# Patient Record
Sex: FEMALE | Race: WHITE | NOT HISPANIC OR LATINO | Employment: FULL TIME | URBAN - METROPOLITAN AREA
[De-identification: names, ages, dates, MRNs, and addresses within clinical notes are randomized per-mention and may not be internally consistent; named-entity substitution may affect disease eponyms.]

---

## 2023-08-18 ENCOUNTER — HOSPITAL ENCOUNTER (OUTPATIENT)
Dept: DATA CONVERSION | Facility: HOSPITAL | Age: 68
Discharge: HOME | End: 2023-08-18
Payer: COMMERCIAL

## 2023-08-18 DIAGNOSIS — Z79.899 OTHER LONG TERM (CURRENT) DRUG THERAPY: ICD-10-CM

## 2023-08-28 LAB
10OH-CARBAZEPINE/CREAT UR CFM: NOT DETECTED NG/MG{CREAT}
6MAM UR QL CFM: NEGATIVE
6MAM/CREAT UR: NORMAL
7AMINOCLONAZEPAM/CREAT UR: NORMAL
8OH-AMOXAPINE UR QL: NOT DETECTED
8OH-LOXAPINE/CREAT UR CFM: NOT DETECTED NG/MG{CREAT}
A-OH ALPRAZ/CREAT UR: NORMAL
A-OH-TRIAZOLAM/CREAT UR CFM: NORMAL NG/MG{CREAT}
ALFENTANIL/CREAT UR CFM: NORMAL NG/MG{CREAT}
ALPHA-HYDROXYMIDAZOLAM: NORMAL
ALPRAZ/CREAT UR CFM: NORMAL NG/MG{CREAT}
AMINO CHLOROPYRIDINE: NOT DETECTED
AMITRIP UR QL CFM: NOT DETECTED
AMOBARBITAL UR QL CFM: NOT DETECTED
AMOXAPINE UR QL: NOT DETECTED
AMPHET/CREAT UR: NORMAL
AMPHETAMINES UR QL CFM: NEGATIVE
ANALGESICS NON-NARCOTIC UR QL: NEGATIVE
ANTICONVULSANTS UR: NORMAL
ANTIDEPRESSANTS UR QL: NORMAL
ANTIHISTAMINES UR QL CFM: NEGATIVE
ANTIPSYCHOTICS UR QL SCN: NEGATIVE
APAP UR QL: NOT DETECTED
ARIPIPRAZOLE/CREAT UR CFM: NOT DETECTED NG/MG{CREAT}
ASENAPINE: NOT DETECTED
ATENOLOL UR QL: NOT DETECTED
ATOMOXETINE/CREAT UR CFM: NOT DETECTED NG/MG{CREAT}
BACLOFEN/CREAT UR CFM: NOT DETECTED NG/MG{CREAT}
BARBITAL UR QL CFM: NOT DETECTED
BARBITURATES UR QL CFM: NEGATIVE
BENZODIAZ UR QL CFM: NEGATIVE
BENZTROPINE UR QL: NOT DETECTED
BROMPHENIRAMINE UR QL: NOT DETECTED
BUPRENORPHINE UR QL CFM: NEGATIVE
BUPRENORPHINE/CREAT UR: NORMAL
BUPROPION UR QL: NOT DETECTED
BUTABARBITAL UR QL CFM: NOT DETECTED
BUTALBITAL UR QL CFM: NOT DETECTED
BUTORPHANOL UR QL CFM: NOT DETECTED
BZE/CREAT UR: NORMAL
CAFFEINE UR QL: NOT DETECTED
CANNABINOIDS UR QL CFM: NEGATIVE
CARBAMAZEPINE UR QL: NOT DETECTED
CARBOXYTHC/CREAT UR: NORMAL
CARISOPRODOL UR QL CFM: NOT DETECTED
CHLORPHENIR UR QL CFM: NOT DETECTED
CHLORPROMAZINE UR QL CFM: NOT DETECTED
CITALOPRAM UR QL: NOT DETECTED
CLOBAZAM UR QL: NOT DETECTED
CLOMIPRAMINE UR QL: NOT DETECTED
CLONAZEPAM/CREAT UR CFM: NORMAL NG/MG{CREAT}
CLONIDINE UR QL: NOT DETECTED
CLOZAPINE UR QL: NOT DETECTED
COCAETHYLENE/CREAT UR CFM: NORMAL NG/MG{CREAT}
COCAINE UR QL CFM: NEGATIVE
COCAINE/CREAT UR CFM: NORMAL NG/MG{CREAT}
CODEINE/CREAT UR: NORMAL
CREAT UR-MCNC: NORMAL MG/DL
CYCLOBENZAPRINE UR QL CFM: NOT DETECTED
D-METHORPHAN UR QL: NOT DETECTED
D-METHORPHAN+LEVORPHANOL UR QL: NORMAL
DESALKYLFLURAZ/CREAT UR: NORMAL NG/MG{CREAT}
DESIPRAMINE UR QL CFM: NOT DETECTED
DESMETHYLCYCLOBENZAPRINE: NOT DETECTED
DESMETHYLFLUNITRAZEPAM: NORMAL
DHC/CREAT UR: NORMAL NG/MG{CREAT}
DIAZEPAM/CREAT UR: NORMAL NG/MG{CREAT}
DICLOFENAC/CREAT UR CFM: NOT DETECTED NG/MG{CREAT}
DIETHYLPROPION UR QL CFM: NOT DETECTED
DILTIAZEM UR QL: NOT DETECTED
DIPHENHY UR QL: NOT DETECTED
DOXEPIN UR QL CFM: NOT DETECTED
DOXYLAMINE UR QL: NOT DETECTED
DRUGS UR CFM: NEGATIVE
DRUGS UR: NORMAL
DULOXETINE UR QL CFM: NOT DETECTED
EDDP/CREAT UR: NORMAL
EPHEDRIN+PSEUDO UR QL: NOT DETECTED
ETHANOL UR CFM-MCNC: NORMAL MG/DL
ETHANOL UR QL CFM: NEGATIVE
EZOGABINE/CREAT UR CFM: NOT DETECTED NG/MG{CREAT}
FENTANYL UR QL CFM: NEGATIVE
FENTANYL/CREAT UR: NORMAL
FLUNITRAZEPAM UR QL CFM: NORMAL
FLUOXETINE UR QL CFM: NOT DETECTED
FLUPHENAZINE UR QL: NOT DETECTED
FLUVOXAMINE UR QL: NOT DETECTED
GABAPENTIN UR QL: PRESENT
GUAIFENESIN/CREAT UR CFM: NOT DETECTED NG/MG{CREAT}
HALLUCINOGENS UR: NEGATIVE
HALOPERIDOL UR QL CFM: NOT DETECTED
HYDROCODONE/CREAT UR: NORMAL
HYDROMORPHONE/CREAT UR: NORMAL
HYDROXYZINE UR QL: NOT DETECTED
HYPNOTICS UR QL SCN: NEGATIVE
IBUPROFEN UR QL: NOT DETECTED
ILOPERIDONE: NOT DETECTED
IMIPRAMINE UR QL CFM: NOT DETECTED
KETAMINE UR QL CFM: NOT DETECTED
KETOPROFEN/CREAT UR CFM: NOT DETECTED NG/MG{CREAT}
LAMOTRIGINE/CREAT UR CFM: NOT DETECTED NG/MG{CREAT}
LEVEL OF DETECTION: (TOX25): NORMAL
LEVETIRACETAM/CREAT UR CFM: NOT DETECTED NG/MG{CREAT}
LIDOCAIN UR QL: NOT DETECTED
LIDOCAIN UR QL: NOT DETECTED
LOCAL ANESTHETICS: NEGATIVE
LORAZEPAM/CREAT UR: NORMAL
LOXAPINE UR QL: NOT DETECTED
LURASIDONE UR CFM-MCNC: NOT DETECTED NG/ML
M-CPP UR QL: NOT DETECTED
MAPROTILINE UR QL: NOT DETECTED
MDA/CREAT UR: NORMAL NG/MG{CREAT}
MDEA UR QL: NOT DETECTED
MDMA/CREAT UR: NORMAL NG/MG{CREAT}
ME-PHENIDATE UR QL CFM: NOT DETECTED
MEPERIDINE UR QL CFM: NOT DETECTED
MEPHOBARBITAL UR QL CFM: NOT DETECTED
MEPIVACAINE UR QL: NOT DETECTED
MEPROBAMATE UR QL CFM: NOT DETECTED
MESORIDAZINE UR QL CFM: NOT DETECTED
METAXALONE/CREAT UR CFM: NOT DETECTED NG/MG{CREAT}
METHADONE UR QL CFM: NEGATIVE
METHADONE/CREAT UR: NORMAL
METHAMPHET/CREAT UR: NORMAL
METHCATHINONE/CREAT UR CFM: NOT DETECTED NG/MG{CREAT}
METHOCARBAMOL UR QL: NOT DETECTED
METOPROLOL UR QL: NOT DETECTED
MIDAZOLAM/CREAT UR CFM: NORMAL NG/MG{CREAT}
MILNACIPRAN/CREAT UR CFM: NOT DETECTED NG/MG{CREAT}
MIRTAZAPINE UR QL CFM: NOT DETECTED
MOLINDONE/CREAT UR CFM: NOT DETECTED NG/MG{CREAT}
MORPHINE/CREAT UR: NORMAL
MUSCLE RELAXANTS: NEGATIVE
N-NORTRAMADOL/CREAT UR CFM: NORMAL NG/MG{CREAT}
NALBUPHINE UR QL CFM: NOT DETECTED
NALTREXONE UR QL CFM: NOT DETECTED
NAPROXEN/CREAT UR CFM: NOT DETECTED NG/MG{CREAT}
NARCOTICS UR: NORMAL
NEFAZODONE UR QL: NOT DETECTED
NORBUPRENORPHINE/CREAT UR: NORMAL
NORCITALOPRAM UR QL: NOT DETECTED
NORCLOMIPRAMINE UR QL: NOT DETECTED
NORCLOZAPINE UR QL: NOT DETECTED
NORCODEINE/CREAT UR CFM: NORMAL NG/MG{CREAT}
NORDIAZEPAM/CREAT UR: NORMAL
NORDOXEPIN UR QL: NOT DETECTED
NORFENTANYL/CREAT UR: NORMAL
NORFLUOXETINE UR QL CFM: NOT DETECTED
NORHYDROCODONE/CREAT UR: NORMAL
NORKETAMINE UR CFM-MCNC: NOT DETECTED NG/ML
NORMEPERIDINE UR QL CFM: NOT DETECTED
NORMORPHINE UR-MCNC: NORMAL NG/ML
NOROXYCODONE/CREAT UR: NORMAL
NOROXYMORPHONE/CREAT UR CFM: NORMAL NG/MG{CREAT}
NORPROPOXYPH UR QL CFM: NOT DETECTED
NORSERTRALINE UR QL: PRESENT
NORTRIP UR QL CFM: NOT DETECTED
O-NORTRAMADOL UR CFM-MCNC: NORMAL NG/ML
ODV UR QL: NOT DETECTED
OH-BUPROPION UR QL CFM: NOT DETECTED
OLANZAPINE UR QL: NOT DETECTED
OPIATES UR QL CFM: NEGATIVE
ORPHENADRINE UR QL: NOT DETECTED
OXAPROZIN/CREAT UR CFM: NOT DETECTED NG/MG{CREAT}
OXAZEPAM/CREAT UR: NORMAL
OXYCODONE UR QL CFM: NEGATIVE
OXYCODONE/CREAT UR: NORMAL
OXYMORPHONE/CREAT UR: NORMAL
PAROXETINE UR QL: NOT DETECTED
PCP UR QL CFM: NOT DETECTED
PENTAZOCINE UR QL CFM: NOT DETECTED
PENTOBARB UR QL CFM: NOT DETECTED
PERPHENAZINE UR QL: NOT DETECTED
PHENMETRAZINE UR QL CFM: NOT DETECTED
PHENOBARB UR QL CFM: NOT DETECTED
PHENTERMINE UR QL CFM: NOT DETECTED
PHENYTOIN UR QL: NOT DETECTED
PIMOZIDE/CREAT UR CFM: NOT DETECTED NG/MG{CREAT}
PPA UR QL: NOT DETECTED
PPAA UR QL: NOT DETECTED
PREGABALIN UR QL CFM: NOT DETECTED
PRIMIDONE/CREAT UR CFM: NOT DETECTED NG/MG{CREAT}
PROCAINE UR QL: NOT DETECTED
PROCHLORPERAZINE UR QL: NOT DETECTED
PROMETHAZINE UR QL: NOT DETECTED
PROPOXYPH UR QL CFM: NOT DETECTED
PROPRANOLOL UR QL: NOT DETECTED
PROTRIP UR QL: NOT DETECTED
PYRILAMINE UR QL: NOT DETECTED
QUETIAPINE UR QL: NOT DETECTED
RISPERIDONE UR QL: NOT DETECTED
RUFINAMIDE/CREAT UR CFM: NOT DETECTED NG/MG{CREAT}
SALICYLATES UR QL: NOT DETECTED
SECOBARBITAL UR QL CFM: NOT DETECTED
SERTRALINE UR QL: PRESENT
SUFENTANIL/CREAT UR CFM: NORMAL NG/MG{CREAT}
SYMPATHOMIMETICS UR QL CFM: NEGATIVE
TAPENTADOL UR QL CFM: NEGATIVE
TAPENTADOL/CREAT UR: NORMAL
TEMAZEPAM/CREAT UR: NORMAL
THEOPHYLLINE/CREAT UR CFM: NOT DETECTED NG/MG{CREAT}
THIOPENTAL UR QL CFM: NOT DETECTED
THIORIDAZINE UR QL CFM: NOT DETECTED
THIOTHIXENE UR QL: NOT DETECTED
TIAGABINE/CREAT UR CFM: NOT DETECTED NG/MG{CREAT}
TIZANIDINE/CREAT UR CFM: NOT DETECTED NG/MG{CREAT}
TOPIRAMATE/CREAT UR CFM: PRESENT NG/MG{CREAT}
TRAMADOL UR QL CFM: NORMAL
TRAZODONE UR QL: NOT DETECTED
TRIFPERAZINE UR QL: NOT DETECTED
TRIMIPRAMINE UR QL: NOT DETECTED
TRIPROLIDINE: NOT DETECTED
VENLAFAXINE UR QL: NOT DETECTED
VERAPAMIL UR QL: NOT DETECTED
VILAZ UR QL: NOT DETECTED
ZALEPLON: NORMAL
ZIPRASIDONE/CREAT UR CFM: NOT DETECTED NG/MG{CREAT}
ZOLPIDEM UR QL CFM: NOT DETECTED
ZOLPIDEM/CREAT UR: NOT DETECTED
ZONISAMIDE/CREAT UR CFM: NOT DETECTED NG/MG{CREAT}
ZOPICLONE UR QL: NOT DETECTED

## 2023-09-15 VITALS
BODY MASS INDEX: 32.88 KG/M2 | HEIGHT: 61 IN | RESPIRATION RATE: 16 BRPM | OXYGEN SATURATION: 97 % | SYSTOLIC BLOOD PRESSURE: 157 MMHG | DIASTOLIC BLOOD PRESSURE: 75 MMHG | HEART RATE: 77 BPM

## 2023-09-20 PROBLEM — R19.5 POSITIVE COLORECTAL CANCER SCREENING USING COLOGUARD TEST: Status: ACTIVE | Noted: 2023-09-20

## 2023-09-20 PROBLEM — R06.02 SHORTNESS OF BREATH: Status: ACTIVE | Noted: 2023-09-20

## 2023-09-20 PROBLEM — E78.5 HYPERLIPIDEMIA: Status: ACTIVE | Noted: 2023-09-20

## 2023-09-20 PROBLEM — R53.83 FATIGUE: Status: ACTIVE | Noted: 2023-09-20

## 2023-09-20 PROBLEM — M54.14 THORACIC RADICULOPATHY: Status: ACTIVE | Noted: 2023-09-20

## 2023-09-20 PROBLEM — I10 HYPERTENSION: Status: ACTIVE | Noted: 2023-09-20

## 2023-09-20 PROBLEM — S42.212S FX HUMERAL NECK, LEFT, SEQUELA: Status: ACTIVE | Noted: 2023-09-20

## 2023-09-20 PROBLEM — M79.18 MYOFASCIAL PAIN: Status: ACTIVE | Noted: 2023-09-20

## 2023-09-20 PROBLEM — R07.89 ATYPICAL CHEST PAIN: Status: ACTIVE | Noted: 2023-09-20

## 2023-09-20 PROBLEM — M75.21 BICEPS TENDONITIS, RIGHT: Status: ACTIVE | Noted: 2023-09-20

## 2023-09-20 PROBLEM — R00.2 PALPITATIONS: Status: ACTIVE | Noted: 2023-09-20

## 2023-09-20 PROBLEM — M51.369 DEGENERATION OF LUMBAR INTERVERTEBRAL DISC: Status: ACTIVE | Noted: 2023-09-20

## 2023-09-20 PROBLEM — M51.36 DEGENERATION OF LUMBAR INTERVERTEBRAL DISC: Status: ACTIVE | Noted: 2023-09-20

## 2023-09-20 PROBLEM — M79.2 NEUROPATHIC PAIN: Status: ACTIVE | Noted: 2023-09-20

## 2023-09-20 PROBLEM — M25.519 SHOULDER PAIN: Status: ACTIVE | Noted: 2023-09-20

## 2023-09-20 PROBLEM — R94.31 ABNORMAL ECG: Status: ACTIVE | Noted: 2023-09-20

## 2023-09-20 PROBLEM — R42 DIZZINESS: Status: ACTIVE | Noted: 2023-09-20

## 2023-09-20 PROBLEM — R26.89 BALANCE PROBLEM: Status: ACTIVE | Noted: 2023-09-20

## 2023-09-20 PROBLEM — E66.9 MILD OBESITY: Status: ACTIVE | Noted: 2023-09-20

## 2023-09-20 PROBLEM — R41.3 MEMORY DEFICIT: Status: ACTIVE | Noted: 2023-09-20

## 2023-09-20 PROBLEM — I47.10 PSVT (PAROXYSMAL SUPRAVENTRICULAR TACHYCARDIA) (CMS-HCC): Status: ACTIVE | Noted: 2023-09-20

## 2023-09-20 PROBLEM — M19.019 ACROMIOCLAVICULAR JOINT ARTHRITIS: Status: ACTIVE | Noted: 2023-09-20

## 2023-09-20 PROBLEM — M54.16 LUMBAR RADICULOPATHY: Status: ACTIVE | Noted: 2023-09-20

## 2023-09-20 PROBLEM — M94.0 COSTOCHONDRITIS: Status: ACTIVE | Noted: 2023-09-20

## 2023-09-20 PROBLEM — J44.9 CHRONIC OBSTRUCTIVE PULMONARY DISEASE (MULTI): Status: ACTIVE | Noted: 2023-09-20

## 2023-09-20 PROBLEM — I49.1 PAC (PREMATURE ATRIAL CONTRACTION): Status: ACTIVE | Noted: 2023-09-20

## 2023-09-20 PROBLEM — M77.00 MEDIAL EPICONDYLITIS OF ELBOW: Status: ACTIVE | Noted: 2023-09-20

## 2023-09-20 RX ORDER — ACETAMINOPHEN 160 MG/5ML
1 SUSPENSION, ORAL (FINAL DOSE FORM) ORAL
COMMUNITY
Start: 2023-07-13 | End: 2023-10-30 | Stop reason: WASHOUT

## 2023-09-20 RX ORDER — BUSPIRONE HYDROCHLORIDE 5 MG/1
1 TABLET ORAL 3 TIMES DAILY PRN
COMMUNITY
Start: 2023-07-13 | End: 2024-04-19 | Stop reason: DRUGHIGH

## 2023-09-20 RX ORDER — DEXAMETHASONE 6 MG/1
1 TABLET ORAL EVERY 24 HOURS
COMMUNITY
Start: 2020-12-24 | End: 2023-10-30 | Stop reason: ALTCHOICE

## 2023-09-20 RX ORDER — TRAMADOL HYDROCHLORIDE 50 MG/1
50 TABLET ORAL EVERY 6 HOURS PRN
COMMUNITY
Start: 2023-04-21 | End: 2023-11-03 | Stop reason: SDUPTHER

## 2023-09-20 RX ORDER — SODIUM, POTASSIUM,MAG SULFATES 17.5-3.13G
SOLUTION, RECONSTITUTED, ORAL ORAL
COMMUNITY
Start: 2020-12-04 | End: 2023-10-30 | Stop reason: ALTCHOICE

## 2023-09-20 RX ORDER — GABAPENTIN 300 MG/1
1 CAPSULE ORAL 3 TIMES DAILY
COMMUNITY
End: 2023-11-03 | Stop reason: SDUPTHER

## 2023-09-20 RX ORDER — ENOXAPARIN SODIUM 100 MG/ML
40 INJECTION SUBCUTANEOUS DAILY
COMMUNITY
Start: 2020-12-22 | End: 2023-10-30 | Stop reason: ALTCHOICE

## 2023-09-20 RX ORDER — TRIAMCINOLONE ACETONIDE 1 MG/ML
1 LOTION TOPICAL 2 TIMES DAILY
COMMUNITY
Start: 2021-07-12 | End: 2023-10-30 | Stop reason: ALTCHOICE

## 2023-09-20 RX ORDER — TOPIRAMATE 50 MG/1
50 TABLET, FILM COATED ORAL NIGHTLY
COMMUNITY
End: 2024-02-09 | Stop reason: WASHOUT

## 2023-09-20 RX ORDER — AMOXICILLIN AND CLAVULANATE POTASSIUM 875; 125 MG/1; MG/1
1 TABLET, FILM COATED ORAL 2 TIMES DAILY
COMMUNITY
Start: 2020-12-24 | End: 2023-10-30 | Stop reason: WASHOUT

## 2023-09-20 RX ORDER — METOPROLOL TARTRATE 100 MG/1
1 TABLET ORAL 2 TIMES DAILY
COMMUNITY
Start: 2016-11-07 | End: 2023-10-30 | Stop reason: ALTCHOICE

## 2023-09-20 RX ORDER — FREMANEZUMAB-VFRM 225 MG/1.5ML
225 INJECTION SUBCUTANEOUS
COMMUNITY
Start: 2023-08-24

## 2023-09-20 RX ORDER — DILTIAZEM HYDROCHLORIDE 240 MG/1
1 CAPSULE, EXTENDED RELEASE ORAL DAILY
COMMUNITY
Start: 2016-08-08 | End: 2023-10-30 | Stop reason: ALTCHOICE

## 2023-10-14 DIAGNOSIS — J44.9 CHRONIC OBSTRUCTIVE PULMONARY DISEASE, UNSPECIFIED COPD TYPE (MULTI): Primary | ICD-10-CM

## 2023-10-14 RX ORDER — FLUTICASONE PROPIONATE AND SALMETEROL 50; 250 UG/1; UG/1
1 POWDER RESPIRATORY (INHALATION) 2 TIMES DAILY
Qty: 60 EACH | Refills: 11 | Status: SHIPPED | OUTPATIENT
Start: 2023-10-14 | End: 2023-10-30 | Stop reason: DRUGHIGH

## 2023-10-18 PROBLEM — G43.009 MIGRAINE WITHOUT AURA AND WITHOUT STATUS MIGRAINOSUS, NOT INTRACTABLE: Status: ACTIVE | Noted: 2023-10-18

## 2023-10-18 RX ORDER — UBIDECARENONE 30 MG
30 CAPSULE ORAL DAILY
COMMUNITY

## 2023-10-18 RX ORDER — ASPIRIN 81 MG/1
81 TABLET ORAL DAILY
COMMUNITY
Start: 2023-10-04

## 2023-10-18 RX ORDER — ERENUMAB-AOOE 70 MG/ML
70 INJECTION SUBCUTANEOUS
COMMUNITY
End: 2023-10-30 | Stop reason: ALTCHOICE

## 2023-10-18 RX ORDER — RIZATRIPTAN BENZOATE 10 MG/1
10 TABLET ORAL ONCE AS NEEDED
COMMUNITY
Start: 2023-09-17 | End: 2023-10-30 | Stop reason: ALTCHOICE

## 2023-10-18 RX ORDER — ATORVASTATIN CALCIUM 40 MG/1
40 TABLET, FILM COATED ORAL DAILY
COMMUNITY
Start: 2023-10-04

## 2023-10-18 RX ORDER — SERTRALINE HYDROCHLORIDE 100 MG/1
100 TABLET, FILM COATED ORAL DAILY
COMMUNITY
Start: 2023-09-17 | End: 2024-04-19 | Stop reason: SDUPTHER

## 2023-10-18 RX ORDER — ERGOCALCIFEROL 1.25 MG/1
1 CAPSULE ORAL
COMMUNITY
Start: 2023-09-20 | End: 2024-04-19 | Stop reason: SDUPTHER

## 2023-10-18 RX ORDER — SUMATRIPTAN SUCCINATE 100 MG/1
100 TABLET ORAL
COMMUNITY
Start: 2023-10-12

## 2023-10-18 RX ORDER — LANOLIN ALCOHOL/MO/W.PET/CERES
1000 CREAM (GRAM) TOPICAL DAILY
COMMUNITY
Start: 2023-09-16 | End: 2024-02-09 | Stop reason: WASHOUT

## 2023-10-24 ASSESSMENT — ENCOUNTER SYMPTOMS
RHINORRHEA: 0
VOMITING: 0
CLAUDICATION: 0
HEMOPTYSIS: 0
SPUTUM PRODUCTION: 1
FEVER: 0
SHORTNESS OF BREATH: 1
LEG PAIN: 0
ABDOMINAL PAIN: 0
SORE THROAT: 0
ORTHOPNEA: 0
SWOLLEN GLANDS: 0
WHEEZING: 1
SYNCOPE: 0
HEADACHES: 1
PND: 0
NECK PAIN: 1

## 2023-10-25 NOTE — PROGRESS NOTES
HPI:       Hypertension:          The patient has questions regarding neurology referral for vestibular migraines. Now getting Ajovy injections. Also on topomax, gabapentin, imitrex.  Pt has been off her valsartin since Sept, pressures have been elevated at other offices.         The patients cardiovascular risk factors include:         Cardiac Risk Factors  Age > 45-male, > 55-female:  YES  +1   Smoking:   NO   Sig. family hx of CHD*:  NO   Hypertension:   YES  +1   Diabetes:   NO   HDL < 35:   NO   HDL > 59:   YES  -1   Total: 1     *- Sig. family h/o CHD per NCEP = MI or sudden death at <54yo in   father or other 1st-degree male relative, or <64yo in mother or   other 1st-degree female relative           The patients adherence to the treatment regimen is Excellent         Responses to the medications has been Excellent    Hyperlipidemia:   The patient does not use medications that may worsen dyslipidemias (corticosteroids, progestins, anabolic steroids, diuretics, beta-blockers, amiodarone, cyclosporine, olanzapine). The patient exercises occasionally.  The patient is not known to have coexisting coronary artery disease.    MEDICATIONS:   Current Outpatient Medications   Medication Sig Dispense Refill    aspirin 81 mg EC tablet Take 1 tablet (81 mg) by mouth once daily.      atorvastatin (Lipitor) 40 mg tablet Take 1 tablet (40 mg) by mouth once daily.      busPIRone (Buspar) 5 mg tablet Take 1 tablet (5 mg) by mouth 3 times a day as needed.      co-enzyme Q-10 (CoQ-10) 30 mg capsule Take 1 capsule (30 mg) by mouth once daily.      cyanocobalamin (Vitamin B-12) 1,000 mcg tablet Take 1 tablet (1,000 mcg) by mouth once daily.      ergocalciferol (Vitamin D-2) 1.25 MG (13066 UT) capsule Take 1 capsule (1,250 mcg) by mouth 1 (one) time per week.      fremanezumab (Ajovy Autoinjector) 225 mg/1.5 mL auto-injector Inject 1 Pen (225 mg) under the skin every 30 (thirty) days.      gabapentin (Neurontin) 300 mg capsule  Take 1 capsule (300 mg) by mouth 3 times a day.      sertraline (Zoloft) 100 mg tablet Take 1 tablet (100 mg) by mouth once daily.      SUMAtriptan (Imitrex) 100 mg tablet Take 1 tablet (100 mg) by mouth.      topiramate (Topamax) 50 mg tablet Take 1 tablet (50 mg) by mouth once daily at bedtime.      traMADol (Ultram) 50 mg tablet Take 1 tablet (50 mg) by mouth every 6 hours if needed.      fluticasone propion-salmeteroL (Advair Diskus) 500-50 mcg/dose diskus inhaler Inhale 1 puff 2 times a day. Rinse mouth with water after use to reduce aftertaste and incidence of candidiasis. Do not swallow. 180 each 3    valsartan (Diovan) 80 mg tablet Take 1 tablet (80 mg) by mouth once daily. 90 tablet 1     No current facility-administered medications for this visit.     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    Dyslipidemia     10 yr risk 3.6% 2018    History of COVID-19 12/2020    Hypertension     Migraines     [facility]    SVT (supraventricular tachycardia)      FAMILY HISTORY:   Family History   Problem Relation Name Age of Onset    Stroke Mother      Hypertension Mother      Blood clot Father      Other (black lung) Father      Atrial fibrillation Sister      Breast cancer Sister      Breast cancer Sister      Alzheimer's disease Sister      Other (pacemaker) Brother      Other (htn induced brain bleed) Son      Dementia Other sister     Heart attack Other sister     Diabetes Other sister      SOCIAL HISTORY:   Social History     Tobacco Use    Smoking status: Former     Types: Cigarettes    Smokeless tobacco: Never   Vaping Use    Vaping Use: Never used   Substance Use Topics    Alcohol use: Never    Drug use:  Never         ROS:      CONSTITUTION:  alert and oriented     OPHTHALMOLOGY:          no Change in vision.         CARDIOLOGY:          no Chest pain.  no Dyspnea on exertion.  no Shortness of breath.         ENDOCRINOLOGY:          no Excessive thirst.  no Excessive urination.  no Fatigue.  no Skin changes.  no Weight gain.  no Weight loss.         SKIN:          no Healing problems.         NEUROLOGY:          no Loss of sensation in specific body area.  no Burning pain in feet.  no Peripheral neuropathy.  no Tingling/numbness.    LABS: due     VITAL SIGNS:  /88   Pulse 76   Wt 90 kg (198 lb 6.4 oz)   BMI 37.49 kg/m²     General Appearance: awake, alert, oriented, in no acute distress  Lungs: Normal expansion.  Clear to auscultation.  No rales, rhonchi, or wheezing.  Heart: Heart sounds are normal.  Regular rate and rhythm without murmur, gallop or rub.  Extremities: Extremities warm to touch, pink, with no edema.  Neurologic: Alert and oriented x 3, gait normal., reflexes normal and symmetric, strength and  sensation grossly normal    Kristy was seen today for hypertension.  Diagnoses and all orders for this visit:  Primary hypertension (Primary)  -     Basic Metabolic Panel; Future  -     Albumin , Urine Random; Future  -     Urinalysis with Reflex Microscopic; Future  -     valsartan (Diovan) 80 mg tablet; Take 1 tablet (80 mg) by mouth once daily.  -     Follow Up In Primary Care - Established; Future  Mixed hyperlipidemia  Comments:  cont atorvastatin  Orders:  -     Lipid Panel; Future  Chronic obstructive pulmonary disease, unspecified COPD type (CMS/HCC)  -     fluticasone propion-salmeteroL (Advair Diskus) 500-50 mcg/dose diskus inhaler; Inhale 1 puff 2 times a day. Rinse mouth with water after use to reduce aftertaste and incidence of candidiasis. Do not swallow.

## 2023-10-30 ENCOUNTER — OFFICE VISIT (OUTPATIENT)
Dept: PRIMARY CARE | Facility: CLINIC | Age: 68
End: 2023-10-30
Payer: COMMERCIAL

## 2023-10-30 ENCOUNTER — LAB (OUTPATIENT)
Dept: LAB | Facility: LAB | Age: 68
End: 2023-10-30
Payer: COMMERCIAL

## 2023-10-30 VITALS
HEART RATE: 76 BPM | SYSTOLIC BLOOD PRESSURE: 132 MMHG | WEIGHT: 198.4 LBS | BODY MASS INDEX: 37.49 KG/M2 | DIASTOLIC BLOOD PRESSURE: 88 MMHG

## 2023-10-30 DIAGNOSIS — E78.2 MIXED HYPERLIPIDEMIA: ICD-10-CM

## 2023-10-30 DIAGNOSIS — J44.9 CHRONIC OBSTRUCTIVE PULMONARY DISEASE, UNSPECIFIED COPD TYPE (MULTI): ICD-10-CM

## 2023-10-30 DIAGNOSIS — I10 PRIMARY HYPERTENSION: Primary | ICD-10-CM

## 2023-10-30 DIAGNOSIS — I10 PRIMARY HYPERTENSION: ICD-10-CM

## 2023-10-30 LAB
ANION GAP SERPL CALC-SCNC: 16 MMOL/L (ref 10–20)
APPEARANCE UR: CLEAR
BILIRUB UR STRIP.AUTO-MCNC: NEGATIVE MG/DL
BUN SERPL-MCNC: 8 MG/DL (ref 6–23)
CALCIUM SERPL-MCNC: 9.9 MG/DL (ref 8.6–10.3)
CHLORIDE SERPL-SCNC: 103 MMOL/L (ref 98–107)
CHOLEST SERPL-MCNC: 178 MG/DL (ref 0–199)
CHOLESTEROL/HDL RATIO: 2
CO2 SERPL-SCNC: 27 MMOL/L (ref 21–32)
COLOR UR: YELLOW
CREAT SERPL-MCNC: 0.74 MG/DL (ref 0.5–1.05)
GFR SERPL CREATININE-BSD FRML MDRD: 89 ML/MIN/1.73M*2
GLUCOSE SERPL-MCNC: 115 MG/DL (ref 74–99)
GLUCOSE UR STRIP.AUTO-MCNC: NEGATIVE MG/DL
HDLC SERPL-MCNC: 87.4 MG/DL
KETONES UR STRIP.AUTO-MCNC: NEGATIVE MG/DL
LDLC SERPL CALC-MCNC: 70 MG/DL
LEUKOCYTE ESTERASE UR QL STRIP.AUTO: NEGATIVE
NITRITE UR QL STRIP.AUTO: NEGATIVE
NON HDL CHOLESTEROL: 91 MG/DL (ref 0–149)
PH UR STRIP.AUTO: 6 [PH]
POTASSIUM SERPL-SCNC: 4.5 MMOL/L (ref 3.5–5.3)
PROT UR STRIP.AUTO-MCNC: NEGATIVE MG/DL
RBC # UR STRIP.AUTO: NEGATIVE /UL
SODIUM SERPL-SCNC: 141 MMOL/L (ref 136–145)
SP GR UR STRIP.AUTO: 1.01
TRIGL SERPL-MCNC: 102 MG/DL (ref 0–149)
UROBILINOGEN UR STRIP.AUTO-MCNC: <2 MG/DL
VLDL: 20 MG/DL (ref 0–40)

## 2023-10-30 PROCEDURE — 99214 OFFICE O/P EST MOD 30 MIN: CPT | Performed by: FAMILY MEDICINE

## 2023-10-30 PROCEDURE — 1159F MED LIST DOCD IN RCRD: CPT | Performed by: FAMILY MEDICINE

## 2023-10-30 PROCEDURE — 3075F SYST BP GE 130 - 139MM HG: CPT | Performed by: FAMILY MEDICINE

## 2023-10-30 PROCEDURE — 3079F DIAST BP 80-89 MM HG: CPT | Performed by: FAMILY MEDICINE

## 2023-10-30 PROCEDURE — 82570 ASSAY OF URINE CREATININE: CPT

## 2023-10-30 PROCEDURE — 1160F RVW MEDS BY RX/DR IN RCRD: CPT | Performed by: FAMILY MEDICINE

## 2023-10-30 PROCEDURE — 36415 COLL VENOUS BLD VENIPUNCTURE: CPT

## 2023-10-30 PROCEDURE — 1036F TOBACCO NON-USER: CPT | Performed by: FAMILY MEDICINE

## 2023-10-30 PROCEDURE — 1125F AMNT PAIN NOTED PAIN PRSNT: CPT | Performed by: FAMILY MEDICINE

## 2023-10-30 PROCEDURE — 82043 UR ALBUMIN QUANTITATIVE: CPT

## 2023-10-30 PROCEDURE — 1170F FXNL STATUS ASSESSED: CPT | Performed by: FAMILY MEDICINE

## 2023-10-30 PROCEDURE — 3074F SYST BP LT 130 MM HG: CPT | Performed by: FAMILY MEDICINE

## 2023-10-30 PROCEDURE — 3078F DIAST BP <80 MM HG: CPT | Performed by: FAMILY MEDICINE

## 2023-10-30 RX ORDER — FLUTICASONE PROPIONATE AND SALMETEROL 500; 50 UG/1; UG/1
1 POWDER RESPIRATORY (INHALATION) 2 TIMES DAILY
Qty: 180 EACH | Refills: 3 | Status: SHIPPED | OUTPATIENT
Start: 2023-10-30 | End: 2024-02-09 | Stop reason: WASHOUT

## 2023-10-30 RX ORDER — VALSARTAN 80 MG/1
80 TABLET ORAL DAILY
Qty: 90 TABLET | Refills: 1 | Status: SHIPPED | OUTPATIENT
Start: 2023-10-30 | End: 2024-04-19 | Stop reason: SDUPTHER

## 2023-10-30 ASSESSMENT — ACTIVITIES OF DAILY LIVING (ADL)
HEARING - RIGHT EAR: FUNCTIONAL
FEEDING YOURSELF: INDEPENDENT
JUDGMENT_ADEQUATE_SAFELY_COMPLETE_DAILY_ACTIVITIES: YES
PATIENT'S MEMORY ADEQUATE TO SAFELY COMPLETE DAILY ACTIVITIES?: YES
BATHING: INDEPENDENT
GROOMING: INDEPENDENT
ASSISTIVE_DEVICE: EYEGLASSES
ADEQUATE_TO_COMPLETE_ADL: YES
HEARING - LEFT EAR: FUNCTIONAL
DRESSING YOURSELF: INDEPENDENT
WALKS IN HOME: INDEPENDENT
TOILETING: INDEPENDENT

## 2023-10-30 ASSESSMENT — PATIENT HEALTH QUESTIONNAIRE - PHQ9
5. POOR APPETITE OR OVEREATING: NOT AT ALL
2. FEELING DOWN, DEPRESSED OR HOPELESS: MORE THAN HALF THE DAYS
1. LITTLE INTEREST OR PLEASURE IN DOING THINGS: MORE THAN HALF THE DAYS
8. MOVING OR SPEAKING SO SLOWLY THAT OTHER PEOPLE COULD HAVE NOTICED. OR THE OPPOSITE, BEING SO FIGETY OR RESTLESS THAT YOU HAVE BEEN MOVING AROUND A LOT MORE THAN USUAL: MORE THAN HALF THE DAYS
9. THOUGHTS THAT YOU WOULD BE BETTER OFF DEAD, OR OF HURTING YOURSELF: NOT AT ALL
SUM OF ALL RESPONSES TO PHQ9 QUESTIONS 1 AND 2: 4
3. TROUBLE FALLING OR STAYING ASLEEP OR SLEEPING TOO MUCH: MORE THAN HALF THE DAYS
SUM OF ALL RESPONSES TO PHQ QUESTIONS 1-9: 12
6. FEELING BAD ABOUT YOURSELF - OR THAT YOU ARE A FAILURE OR HAVE LET YOURSELF OR YOUR FAMILY DOWN: NOT AT ALL
4. FEELING TIRED OR HAVING LITTLE ENERGY: MORE THAN HALF THE DAYS
7. TROUBLE CONCENTRATING ON THINGS, SUCH AS READING THE NEWSPAPER OR WATCHING TELEVISION: MORE THAN HALF THE DAYS
10. IF YOU CHECKED OFF ANY PROBLEMS, HOW DIFFICULT HAVE THESE PROBLEMS MADE IT FOR YOU TO DO YOUR WORK, TAKE CARE OF THINGS AT HOME, OR GET ALONG WITH OTHER PEOPLE: SOMEWHAT DIFFICULT

## 2023-10-30 ASSESSMENT — PAIN SCALES - GENERAL: PAINLEVEL: 7

## 2023-10-31 LAB
CREAT UR-MCNC: 48.1 MG/DL (ref 20–320)
MICROALBUMIN UR-MCNC: <7 MG/L
MICROALBUMIN/CREAT UR: NORMAL MG/G{CREAT}

## 2023-11-03 ENCOUNTER — OFFICE VISIT (OUTPATIENT)
Dept: PAIN MEDICINE | Facility: CLINIC | Age: 68
End: 2023-11-03
Payer: COMMERCIAL

## 2023-11-03 VITALS
WEIGHT: 198 LBS | DIASTOLIC BLOOD PRESSURE: 68 MMHG | SYSTOLIC BLOOD PRESSURE: 120 MMHG | HEIGHT: 61 IN | BODY MASS INDEX: 37.38 KG/M2

## 2023-11-03 DIAGNOSIS — M75.21 BILATERAL BICIPITAL TENOSYNOVITIS: ICD-10-CM

## 2023-11-03 DIAGNOSIS — M54.16 LUMBAR RADICULOPATHY: ICD-10-CM

## 2023-11-03 DIAGNOSIS — M77.01 MEDIAL EPICONDYLITIS OF RIGHT ELBOW: ICD-10-CM

## 2023-11-03 DIAGNOSIS — M75.22 BILATERAL BICIPITAL TENOSYNOVITIS: ICD-10-CM

## 2023-11-03 DIAGNOSIS — M77.02 MEDIAL EPICONDYLITIS OF LEFT ELBOW: Primary | ICD-10-CM

## 2023-11-03 DIAGNOSIS — M79.18 MYOFASCIAL PAIN: ICD-10-CM

## 2023-11-03 PROBLEM — R10.32 LEFT INGUINAL PAIN: Status: ACTIVE | Noted: 2022-12-02

## 2023-11-03 PROBLEM — M75.20 BICEPS TENDINITIS: Status: ACTIVE | Noted: 2022-12-02

## 2023-11-03 PROBLEM — M75.111 NONTRAUMATIC INCOMPLETE TEAR OF RIGHT ROTATOR CUFF: Status: ACTIVE | Noted: 2023-01-13

## 2023-11-03 PROBLEM — M75.20 BICIPITAL TENOSYNOVITIS: Status: ACTIVE | Noted: 2022-12-02

## 2023-11-03 PROBLEM — R07.9 CHEST PAIN: Status: ACTIVE | Noted: 2022-12-02

## 2023-11-03 PROBLEM — I10 ESSENTIAL (PRIMARY) HYPERTENSION: Status: ACTIVE | Noted: 2022-12-02

## 2023-11-03 PROBLEM — U07.1 COVID-19: Status: ACTIVE | Noted: 2022-12-02

## 2023-11-03 PROBLEM — S42.213A FRACTURE OF NECK OF HUMERUS: Status: ACTIVE | Noted: 2022-12-02

## 2023-11-03 PROBLEM — G89.29 CHRONIC PAIN: Status: ACTIVE | Noted: 2022-12-02

## 2023-11-03 PROBLEM — I47.10 PAROXYSMAL SUPRAVENTRICULAR TACHYCARDIA (CMS-HCC): Status: ACTIVE | Noted: 2022-12-02

## 2023-11-03 PROBLEM — R94.31 ABNORMAL ELECTROCARDIOGRAPHY: Status: ACTIVE | Noted: 2022-11-25

## 2023-11-03 PROBLEM — R26.89 BALANCE PROBLEMS: Status: ACTIVE | Noted: 2022-12-02

## 2023-11-03 PROBLEM — R06.02 SHORTNESS OF BREATH: Status: ACTIVE | Noted: 2022-02-18

## 2023-11-03 PROBLEM — M51.36 DEGENERATION OF INTERVERTEBRAL DISC OF LUMBAR REGION: Status: ACTIVE | Noted: 2022-12-02

## 2023-11-03 PROBLEM — M51.369 DEGENERATION OF INTERVERTEBRAL DISC OF LUMBAR REGION: Status: ACTIVE | Noted: 2022-12-02

## 2023-11-03 PROBLEM — M75.100 NONTRAUMATIC TEAR OF ROTATOR CUFF: Status: ACTIVE | Noted: 2022-12-02

## 2023-11-03 PROBLEM — E66.9 OBESITY: Status: ACTIVE | Noted: 2022-12-02

## 2023-11-03 PROCEDURE — 20550 NJX 1 TENDON SHEATH/LIGAMENT: CPT | Performed by: PHYSICIAN ASSISTANT

## 2023-11-03 PROCEDURE — 1159F MED LIST DOCD IN RCRD: CPT | Performed by: PHYSICIAN ASSISTANT

## 2023-11-03 PROCEDURE — 1160F RVW MEDS BY RX/DR IN RCRD: CPT | Performed by: PHYSICIAN ASSISTANT

## 2023-11-03 PROCEDURE — 99214 OFFICE O/P EST MOD 30 MIN: CPT | Mod: 50,25 | Performed by: PHYSICIAN ASSISTANT

## 2023-11-03 PROCEDURE — 2500000004 HC RX 250 GENERAL PHARMACY W/ HCPCS (ALT 636 FOR OP/ED): Performed by: PHYSICIAN ASSISTANT

## 2023-11-03 PROCEDURE — 20552 NJX 1/MLT TRIGGER POINT 1/2: CPT | Performed by: PHYSICIAN ASSISTANT

## 2023-11-03 PROCEDURE — 99214 OFFICE O/P EST MOD 30 MIN: CPT | Performed by: PHYSICIAN ASSISTANT

## 2023-11-03 PROCEDURE — 3078F DIAST BP <80 MM HG: CPT | Performed by: PHYSICIAN ASSISTANT

## 2023-11-03 PROCEDURE — 2500000005 HC RX 250 GENERAL PHARMACY W/O HCPCS: Performed by: PHYSICIAN ASSISTANT

## 2023-11-03 PROCEDURE — 3074F SYST BP LT 130 MM HG: CPT | Performed by: PHYSICIAN ASSISTANT

## 2023-11-03 PROCEDURE — 20550 NJX 1 TENDON SHEATH/LIGAMENT: CPT | Mod: 50 | Performed by: PHYSICIAN ASSISTANT

## 2023-11-03 PROCEDURE — 1036F TOBACCO NON-USER: CPT | Performed by: PHYSICIAN ASSISTANT

## 2023-11-03 PROCEDURE — 1125F AMNT PAIN NOTED PAIN PRSNT: CPT | Performed by: PHYSICIAN ASSISTANT

## 2023-11-03 PROCEDURE — 3075F SYST BP GE 130 - 139MM HG: CPT | Performed by: PHYSICIAN ASSISTANT

## 2023-11-03 RX ORDER — TRIAMCINOLONE ACETONIDE 40 MG/ML
80 INJECTION, SUSPENSION INTRA-ARTICULAR; INTRAMUSCULAR ONCE
Status: COMPLETED | OUTPATIENT
Start: 2023-11-03 | End: 2023-11-03

## 2023-11-03 RX ORDER — LIDOCAINE HYDROCHLORIDE 10 MG/ML
2 INJECTION, SOLUTION EPIDURAL; INFILTRATION; INTRACAUDAL; PERINEURAL ONCE
Status: COMPLETED | OUTPATIENT
Start: 2023-11-03 | End: 2023-11-03

## 2023-11-03 RX ORDER — TRAMADOL HYDROCHLORIDE 50 MG/1
50 TABLET ORAL EVERY 6 HOURS PRN
Qty: 120 TABLET | Refills: 2 | Status: SHIPPED | OUTPATIENT
Start: 2023-11-03 | End: 2024-05-09 | Stop reason: WASHOUT

## 2023-11-03 RX ORDER — GABAPENTIN 300 MG/1
300 CAPSULE ORAL 3 TIMES DAILY
Qty: 90 CAPSULE | Refills: 2 | Status: SHIPPED | OUTPATIENT
Start: 2023-11-03 | End: 2024-02-09 | Stop reason: SDUPTHER

## 2023-11-03 RX ADMIN — TRIAMCINOLONE ACETONIDE 80 MG: 40 INJECTION, SUSPENSION INTRA-ARTICULAR; INTRAMUSCULAR at 11:47

## 2023-11-03 RX ADMIN — LIDOCAINE HYDROCHLORIDE 20 MG: 10 INJECTION, SOLUTION EPIDURAL; INFILTRATION; INTRACAUDAL; PERINEURAL at 11:46

## 2023-11-03 ASSESSMENT — PATIENT HEALTH QUESTIONNAIRE - PHQ9
SUM OF ALL RESPONSES TO PHQ9 QUESTIONS 1 AND 2: 0
1. LITTLE INTEREST OR PLEASURE IN DOING THINGS: NOT AT ALL
2. FEELING DOWN, DEPRESSED OR HOPELESS: NOT AT ALL

## 2023-11-03 ASSESSMENT — LIFESTYLE VARIABLES: TOTAL SCORE: 2

## 2023-11-03 ASSESSMENT — PAIN SCALES - GENERAL
PAINLEVEL: 8
PAINLEVEL_OUTOF10: 8

## 2023-11-03 ASSESSMENT — ENCOUNTER SYMPTOMS: BACK PAIN: 1

## 2023-11-03 ASSESSMENT — PAIN - FUNCTIONAL ASSESSMENT: PAIN_FUNCTIONAL_ASSESSMENT: 0-10

## 2023-11-03 ASSESSMENT — PAIN DESCRIPTION - DESCRIPTORS: DESCRIPTORS: TINGLING;ACHING

## 2023-11-03 NOTE — ASSESSMENT & PLAN NOTE
I had nice discussion with the patient today our plan will be as follows.      Radiology: [ none at this time ]      Physically:  [ continue modification of activities, healthy lifestyle choice ]      Psychologically:  [ No acute psychological concerns ]      Medication: [I will refill the medications at the same dose and frequency. We will continue to monitor the patient every 3 months for compliance, adverse reaction or interations The patient continues to see benefit and improvement in their quality of life and ability to maintain ADLs. Patient educated about the risks of taking opioids and operating a motor vehicle. Patient reports no adverse side effects to current medication regimen. Current regimen does allow patient to maintain ADLs. Oarrs has been reviewed. No suspicion of diversion or abuse. Compliance with medication regime, no use of illicit drugs, no sharing of narcotic medications with others, do not use others narcotic medication, and to avoid alcohol use. Patient has been educated on the risks, benefits, and alternatives of controlled substances as well as the proper way to store these medications.   The patient and I discussed the nature of this medication and its side effects. We discussed tolerance, physical dependence, psychological dependence, addiction and opioid-induced hyperalgesia     I have patient do a trial of twice daily formulation of gabapentin if patient's symptoms increase she will contact our office and resume the 3 times daily if not I like her to stay on the twice daily and we will reassess in next appointment for continued titration down as long as it continues to no longer provide benefit. ]      Duration:  [ 3 months ]      Intervention:  [ none at this time. Patient is stable.  ]  Sterile technique and informed consent the right trigger point region and lumbar was infiltrated with a cocktail consisting of 1 cc Kenalog and 1 cc 1% Xylocaine.  Patient tolerated the procedure  well hemostasis easily achieved post care instructions provided patient advised to take it easy for the next 72 hours  Under sterile technique and informed consent the medial bilateral epicondyles were infiltrated a cocktail consisting of 1 cc Kenalog and 1 cc of Xylocaine spread over both procedures patient tolerated procedure well hemostasis easily achieved post care instructions provided patient vies take it easy for neck 72 hours

## 2023-11-03 NOTE — PROGRESS NOTES
Subjective   Patient ID: Kristy Chapin is a 68 y.o. female who presents for Back Pain.  Patient is a 68-year-old female with lumbosacral radiculopathy bilateral epicondylitis myofascial pain the presents today for follow-up.  Patient did notes that her shoulder not as bad her elbows are causing aggravation medially she does did note that she is having the spasm in the right lumbar region she continues to have the right lower extremity pain.  Patient did notes that she has been having some balance issues and her primary care has been making some changes to her hypertensive medications.     Back Pain        Review of Systems   Musculoskeletal:  Positive for back pain.       Objective   Physical Exam  Vitals reviewed.   Constitutional:       Appearance: Normal appearance.   HENT:      Head: Normocephalic and atraumatic.      Mouth/Throat:      Mouth: Mucous membranes are moist.   Neck:      Vascular: No JVD.   Pulmonary:      Effort: Pulmonary effort is normal. No tachypnea or bradypnea.   Abdominal:      Palpations: Abdomen is soft.   Musculoskeletal:      Right elbow: No effusion or lacerations. Normal range of motion. Tenderness present in medial epicondyle.      Left elbow: No deformity, effusion or lacerations. Normal range of motion. Tenderness present in medial epicondyle.      Lumbar back: Spasms and tenderness present. Decreased range of motion. Positive right straight leg raise test. Negative left straight leg raise test.        Back:       Comments: The right bicipital groove also tenderness and also aggravated with resisted elbow flexion   Skin:     General: Skin is warm and dry.   Neurological:      Mental Status: She is alert and oriented to person, place, and time.   Psychiatric:         Mood and Affect: Mood normal.         Behavior: Behavior normal. Behavior is cooperative.         Assessment/Plan   Problem List Items Addressed This Visit             ICD-10-CM    Lumbar radiculopathy M54.16       I  had nice discussion with the patient today our plan will be as follows.      Radiology: [ none at this time ]      Physically:  [ continue modification of activities, healthy lifestyle choice ]      Psychologically:  [ No acute psychological concerns ]      Medication: [I will refill the medications at the same dose and frequency. We will continue to monitor the patient every 3 months for compliance, adverse reaction or interations The patient continues to see benefit and improvement in their quality of life and ability to maintain ADLs. Patient educated about the risks of taking opioids and operating a motor vehicle. Patient reports no adverse side effects to current medication regimen. Current regimen does allow patient to maintain ADLs. Oarrs has been reviewed. No suspicion of diversion or abuse. Compliance with medication regime, no use of illicit drugs, no sharing of narcotic medications with others, do not use others narcotic medication, and to avoid alcohol use. Patient has been educated on the risks, benefits, and alternatives of controlled substances as well as the proper way to store these medications.   The patient and I discussed the nature of this medication and its side effects. We discussed tolerance, physical dependence, psychological dependence, addiction and opioid-induced hyperalgesia     I have patient do a trial of twice daily formulation of gabapentin if patient's symptoms increase she will contact our office and resume the 3 times daily if not I like her to stay on the twice daily and we will reassess in next appointment for continued titration down as long as it continues to no longer provide benefit. ]      Duration:  [ 3 months ]      Intervention:  [ none at this time. Patient is stable.  ]  Sterile technique and informed consent the right trigger point region and lumbar was infiltrated with a cocktail consisting of 1 cc Kenalog and 1 cc 1% Xylocaine.  Patient tolerated the procedure well  hemostasis easily achieved post care instructions provided patient advised to take it easy for the next 72 hours  Under sterile technique and informed consent the medial bilateral epicondyles were infiltrated a cocktail consisting of 1 cc Kenalog and 1 cc of Xylocaine spread over both procedures patient tolerated procedure well hemostasis easily achieved post care instructions provided patient vies take it easy for neck 72 hours         Medial epicondylitis of elbow - Primary M77.00    Myofascial pain M79.18    Bicipital tenosynovitis M75.20

## 2023-11-03 NOTE — PROGRESS NOTES
MEDICATION NAME: tramadol  STRENGTH: 50mg  LAST FILL DATE: 10/15/23  DATE LAST TAKEN: 11/3/23  QUANTITY FILLED: 120  QUANTITY REMAIN  COUNT COMPLETED BY: JEN PETER RN and DYLAN VILLALBA        CONTROLLED SUBSTANCES AGREEMENT LAST SIGNED: 2023  ORT LAST COMPLETED:2023  Modified Oswestry disability form filled out annually.

## 2023-11-05 NOTE — PROGRESS NOTES
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      atorvastatin (Lipitor) 40 mg tablet Take 1 tablet (40 mg) by mouth once daily.      busPIRone (Buspar) 5 mg tablet Take 1 tablet (5 mg) by mouth 3 times a day as needed.      co-enzyme Q-10 (CoQ-10) 30 mg capsule Take 1 capsule (30 mg) by mouth once daily.      cyanocobalamin (Vitamin B-12) 1,000 mcg tablet Take 1 tablet (1,000 mcg) by mouth once daily.      ergocalciferol (Vitamin D-2) 1.25 MG (93590 UT) capsule Take 1 capsule (1,250 mcg) by mouth 1 (one) time per week.      fluticasone propion-salmeteroL (Advair Diskus) 500-50 mcg/dose diskus inhaler Inhale 1 puff 2 times a day. Rinse mouth with water after use to reduce aftertaste and incidence of candidiasis. Do not swallow. 180 each 3    fremanezumab (Ajovy Autoinjector) 225 mg/1.5 mL auto-injector Inject 1 Pen (225 mg) under the skin every 30 (thirty) days.      gabapentin (Neurontin) 300 mg capsule Take 1 capsule (300 mg) by mouth 3 times a day. (Patient taking differently: Take 1 capsule (300 mg) by mouth 3 times a day. Twice a day) 90 capsule 2    sertraline (Zoloft) 100 mg tablet Take 1 tablet (100 mg) by mouth once daily.      SUMAtriptan (Imitrex) 100 mg tablet Take 1 tablet (100 mg) by  mouth.      topiramate (Topamax) 50 mg tablet Take 1 tablet (50 mg) by mouth once daily at bedtime.      traMADol (Ultram) 50 mg tablet Take 1 tablet (50 mg) by mouth every 6 hours if needed for moderate pain (4 - 6) or severe pain (7 - 10). 120 tablet 2    valsartan (Diovan) 80 mg tablet Take 1 tablet (80 mg) by mouth once daily. 90 tablet 1    erenumab (Aimovig Autoinjector) 70 mg/mL injection Inject 1 mL (70 mg) under the skin every 28 (twenty-eight) days.       No current facility-administered medications for this visit.     ALLERGIES:    Allergies   Allergen Reactions    Oxycodone-Acetaminophen Other     extreme fatigue    Duloxetine Other and Palpitations     nausea     SOCIAL HX:    Social History     Tobacco Use    Smoking status: Former     Packs/day: 4.00     Years: 50.00     Additional pack years: 0.00     Total pack years: 200.00     Types: Cigarettes, Cigars     Start date: 1970     Quit date: 2014     Years since quittin.9    Smokeless tobacco: Never    Tobacco comments:     3 to 4 pks a day   Vaping Use    Vaping Use: Never used   Substance Use Topics    Alcohol use: Not Currently     Comment: Not drank since     Drug use: Never     FAMILY HX:   Family History   Problem Relation Name Age of Onset    Stroke Mother Miriam     Hypertension Mother Miriam     Diabetes Mother Miriam     Migraines Mother Miriam     Blood clot Father Ed     Other (black lung) Father Ed     Arthritis Father Ed     Cancer Father Ed     COPD Father Ed     Atrial fibrillation Sister Berd     Breast cancer Sister Berd     Cancer Sister Berd     Breast cancer Sister Berdie     Alzheimer's disease Sister Berdie     Other (pacemaker) Brother      Other (htn induced brain bleed) Son      Dementia Other sister     Heart attack Other sister     Diabetes Other sister     Alzheimer's disease Father's Sister Joe     Cancer Sister Mar     Depression Sister Mar     Developmental delay Sister Mar     Cancer Sister Peg     Depression  "Sister Peg        ROS:             CONSTITUTIONAL:          Negative for concerns, fever, chills.         HEENT:          no Difficulty swallowing.  no Hearing loss.  no Poor sense of smell.   No change in vision ; no headaches     CARDIOLOGY:          Chest pain none.  Palpitations none.  Shortness of breath none.         RESPIRATORY:          Negative for persistent cough , wheezing, trouble breathing.         GASTROENTEROLOGY:          Negative for abdominal pain, change in bowel habits.         ENDOCRINOLOGY:          Negative for fatigue, polydipsia, polyuria, weight loss, weight gain, cold intolerance, heat intolerance.         MUSCULOSKELETAL:          Negative for myalgias, joint pain.         DERMATOLOGY:          Negative for rash, bruising, moles.         NEUROLOGY:          Negative for weakness, headache, dizziness.     Lab Results   Component Value Date    GLUCOSE 115 (H) 10/30/2023    CALCIUM 9.9 10/30/2023     10/30/2023    K 4.5 10/30/2023    CO2 27 10/30/2023     10/30/2023    BUN 8 10/30/2023    CREATININE 0.74 10/30/2023     Lab Results   Component Value Date    CHOL 178 10/30/2023    CHOL 258 (H) 11/19/2021    CHOL 234 (H) 11/13/2020     Lab Results   Component Value Date    HDL 87.4 10/30/2023    HDL 93 11/19/2021    HDL 85 11/13/2020     Lab Results   Component Value Date    LDLCALC 70 10/30/2023    LDLCALC 153 (H) 11/19/2021    LDLCALC 134 (H) 11/13/2020     Lab Results   Component Value Date    TRIG 102 10/30/2023    TRIG 58 11/19/2021    TRIG 76 11/13/2020     No components found for: \"CHOLHDL\"        VITALS: /78 (BP Location: Right arm)   Pulse 79   Ht 1.6 m (5' 3\")   Wt 88.1 kg (194 lb 3.2 oz)   SpO2 95%   BMI 34.40 kg/m²      PE:  General Appearance: awake, alert, oriented, in no acute distress  Skin: there are no suspicious lesions or rashes of concern  Head/Face: NCAT  Eyes: PERRL and EOMI  Ears: canals and TMs NI  Mouth/Throat: Mucosa moist, no lesions; pharynx " without erythema, edema or exudate.  Neck: neck- supple, no mass, non-tender  Lungs: Normal expansion.  Clear to auscultation.  No rales, rhonchi, or wheezing.  Heart: Heart sounds are normal.  Regular rate and rhythm without murmur, gallop or rub.  Abdomen: Soft, non-tender, normal bowel sounds; no bruits, organomegaly or masses.  Musculoskeletal: Spine range of motion normal. Muscular strength intact., Range of motion normal in hips, knees, shoulders, and spine.  Neurologic: Alert and oriented x 3, gait normal., reflexes normal and symmetric, strength and  sensation grossly normal      Kristy was seen today for annual exam.  Diagnoses and all orders for this visit:  Annual physical exam (Primary)

## 2023-11-10 ENCOUNTER — OFFICE VISIT (OUTPATIENT)
Dept: PRIMARY CARE | Facility: CLINIC | Age: 68
End: 2023-11-10
Payer: COMMERCIAL

## 2023-11-10 VITALS
DIASTOLIC BLOOD PRESSURE: 78 MMHG | HEIGHT: 63 IN | SYSTOLIC BLOOD PRESSURE: 138 MMHG | HEART RATE: 79 BPM | BODY MASS INDEX: 34.41 KG/M2 | WEIGHT: 194.2 LBS | OXYGEN SATURATION: 95 %

## 2023-11-10 DIAGNOSIS — Z00.00 ANNUAL PHYSICAL EXAM: Primary | ICD-10-CM

## 2023-11-10 PROCEDURE — 99397 PER PM REEVAL EST PAT 65+ YR: CPT | Performed by: FAMILY MEDICINE

## 2023-11-10 PROCEDURE — 1125F AMNT PAIN NOTED PAIN PRSNT: CPT | Performed by: FAMILY MEDICINE

## 2023-11-10 PROCEDURE — 3078F DIAST BP <80 MM HG: CPT | Performed by: FAMILY MEDICINE

## 2023-11-10 PROCEDURE — 1160F RVW MEDS BY RX/DR IN RCRD: CPT | Performed by: FAMILY MEDICINE

## 2023-11-10 PROCEDURE — 1036F TOBACCO NON-USER: CPT | Performed by: FAMILY MEDICINE

## 2023-11-10 PROCEDURE — 3075F SYST BP GE 130 - 139MM HG: CPT | Performed by: FAMILY MEDICINE

## 2023-11-10 PROCEDURE — 1159F MED LIST DOCD IN RCRD: CPT | Performed by: FAMILY MEDICINE

## 2023-11-10 RX ORDER — ERENUMAB-AOOE 70 MG/ML
70 INJECTION SUBCUTANEOUS
COMMUNITY
End: 2024-02-09 | Stop reason: WASHOUT

## 2023-11-10 ASSESSMENT — ENCOUNTER SYMPTOMS
OCCASIONAL FEELINGS OF UNSTEADINESS: 1
DEPRESSION: 1
LOSS OF SENSATION IN FEET: 0

## 2023-11-10 ASSESSMENT — PATIENT HEALTH QUESTIONNAIRE - PHQ9
2. FEELING DOWN, DEPRESSED OR HOPELESS: NOT AT ALL
1. LITTLE INTEREST OR PLEASURE IN DOING THINGS: NOT AT ALL
SUM OF ALL RESPONSES TO PHQ9 QUESTIONS 1 AND 2: 0

## 2023-11-10 ASSESSMENT — PAIN SCALES - GENERAL: PAINLEVEL: 6

## 2023-11-10 NOTE — PATIENT INSTRUCTIONS
PAIN MANAGEMENT IN ADULTS    What is pain? Pain is your body’s way to reacting to injury or illness. Everybody reacts to pain in different ways. What you think is painful may not be painful to someone else. But, pain is whatever you say it is!  What causes pain? Many things, such as an injury, surgery, or a disease can cause pain. Some pain is caused by pressure one a nerve, such as a cancerous tumor or slipped disc. Other pain is caused when nerves are cut as in an accident or surgery. After an injury or surgery you may not want to move the painful part of our body at all. But, you may have pain because you are not moving this body part. Sometimes there is no clear reason for your pain.    What are the different types of pain?  Acute pain is short?lived and lasts less than 3 months. Caregivers help first work to remove the cause of the pain, such as fixing a broken arm. Acute pain usually can be controlled or stopped with pain medicine.  Chronic pain lasts longer than 3?6 months. This kind of pain is often more complicated. Caregivers may use medicine along with other treatments, like self?hypnosis and relaxation to help you learn to deal with the chronic pain.  What is your pain like? Caregivers want you to talk to them about your pain. This helps them learn what may be causing the pain and how best to treat it.  Why is pain control important? Pain can affect your appetite, how well you sleep, your energy and your ability to do things. Pain can also affect your mood and relationship with others. If caregivers can help you control your pain, you will suffer less and can even heal faster.  How can pain medicine be given? Following are the many different ways pain medicine can be given depending on the kind of pain you are experiencing.  By mouth: you may be given pills or liquid to swallow or you may be given a pill or liquid to put under your tongue. Some medicine can also be given as a lozenge like a cough drop or  even a special lollipop.  Rectal: medicine in a suppository is put into your rectum.  Shot/Injection: pain medicine can be given as a shot/injection into an IV, into a muscle or under the skin  Topical: medicine in a cream or gel is spread over the skin.  Transdermal: medicine given in a patch and put onto the skin. This medicine is released slowly to give pain relief for as long as 72 hours.    How can you take pain medicine safely and make it work best for you? The following are many different ways pain medicine can be given depending on the kind of pain you have.  Some pain medicines can make you breathe less deeply and less often. The medicine may also make you sleepy, dizzy, and unsafe to drive a car or use heavy equipment. For these reasons, it is very important to follow your caregiver’s advice on how to use this medicine.  Sometimes the pain is worse when you first wake up in the morning. This may happened if you did not have enough pain medicine in your blood stream to last through the night. Caregivers may tell you to take a dose of pain medicine during the night.  Some foods, alcohol, and other medicines may cause unpleasant side effects when you take pain medicine. Follow your caregiver’s advice about how to prevent these problems.  Pain medicine can make you constipated. Straining with a BM can make you pain worse. Do not try to push the BM out if it’s too hard. Contact your caregiver if you become constipated.  Other non?drug pain control methods. There are many pain control techniques that can held you deal with pain even if it does not go away completely. It is important to practice some of the techniques when you don’t have pain if possible. This will help the technique work better during an attack of pain.  Cold and heat: both cold and heat can help lessen some types of post?op pain. Caregivers will tell you if cold and/or hot packs will help your pain.  Distraction: teaches you to focus your  attention on something other than pain. Playing cards or games, talking and visiting family may relax you and keep you from thinking about pain.  Hydrotherapy: is a gentle water exercise program. It can strengthen muscles that are not injured and lessen inflammation. Talk to your physical therapist or your caregiver about starting a hydrotherapy program.  Massage  Music  Physical therapy  Rest  Surgery/Nerve blocks  Call your caregiver if you have any of the following problems:  The pain medicine you are taking makes you sleepier than usual or confused  You have new pain or the pain seems different than before  You have constipation  Care agreement: You have the right to help plan your care. To help with this plan you must learn about your pain, what is causing it, and how it can be treated. You can then discuss treatment options with your caregivers. Work with them to decide what care will be used to treat you. You always have the right to refuse treatment.

## 2023-11-17 ENCOUNTER — APPOINTMENT (OUTPATIENT)
Dept: PAIN MEDICINE | Facility: CLINIC | Age: 68
End: 2023-11-17
Payer: COMMERCIAL

## 2024-01-10 ENCOUNTER — TELEPHONE (OUTPATIENT)
Dept: PAIN MEDICINE | Facility: CLINIC | Age: 69
End: 2024-01-10
Payer: COMMERCIAL

## 2024-02-09 ENCOUNTER — OFFICE VISIT (OUTPATIENT)
Dept: PAIN MEDICINE | Facility: CLINIC | Age: 69
End: 2024-02-09
Payer: COMMERCIAL

## 2024-02-09 VITALS
SYSTOLIC BLOOD PRESSURE: 130 MMHG | HEART RATE: 76 BPM | DIASTOLIC BLOOD PRESSURE: 80 MMHG | HEIGHT: 63 IN | BODY MASS INDEX: 34.41 KG/M2 | WEIGHT: 194.22 LBS

## 2024-02-09 DIAGNOSIS — Z79.899 HISTORY OF ONGOING TREATMENT WITH HIGH-RISK MEDICATION: ICD-10-CM

## 2024-02-09 DIAGNOSIS — M79.18 MYOFASCIAL PAIN: ICD-10-CM

## 2024-02-09 DIAGNOSIS — M54.16 LUMBAR RADICULOPATHY: Primary | ICD-10-CM

## 2024-02-09 DIAGNOSIS — M75.21 BICEPS TENDONITIS, RIGHT: ICD-10-CM

## 2024-02-09 DIAGNOSIS — R26.89 BALANCE PROBLEM: ICD-10-CM

## 2024-02-09 DIAGNOSIS — G43.009 MIGRAINE WITHOUT AURA AND WITHOUT STATUS MIGRAINOSUS, NOT INTRACTABLE: ICD-10-CM

## 2024-02-09 DIAGNOSIS — R42 DIZZINESS: ICD-10-CM

## 2024-02-09 DIAGNOSIS — M77.01 MEDIAL EPICONDYLITIS OF RIGHT ELBOW: ICD-10-CM

## 2024-02-09 PROCEDURE — 80346 BENZODIAZEPINES1-12: CPT | Mod: WESLAB | Performed by: PHYSICIAN ASSISTANT

## 2024-02-09 PROCEDURE — 99214 OFFICE O/P EST MOD 30 MIN: CPT | Performed by: PHYSICIAN ASSISTANT

## 2024-02-09 PROCEDURE — 20550 NJX 1 TENDON SHEATH/LIGAMENT: CPT | Performed by: PHYSICIAN ASSISTANT

## 2024-02-09 PROCEDURE — 20551 NJX 1 TENDON ORIGIN/INSJ: CPT | Performed by: PHYSICIAN ASSISTANT

## 2024-02-09 PROCEDURE — 1159F MED LIST DOCD IN RCRD: CPT | Performed by: PHYSICIAN ASSISTANT

## 2024-02-09 PROCEDURE — 20552 NJX 1/MLT TRIGGER POINT 1/2: CPT | Performed by: PHYSICIAN ASSISTANT

## 2024-02-09 PROCEDURE — 3079F DIAST BP 80-89 MM HG: CPT | Performed by: PHYSICIAN ASSISTANT

## 2024-02-09 PROCEDURE — 2500000005 HC RX 250 GENERAL PHARMACY W/O HCPCS: Performed by: PHYSICIAN ASSISTANT

## 2024-02-09 PROCEDURE — 2500000004 HC RX 250 GENERAL PHARMACY W/ HCPCS (ALT 636 FOR OP/ED): Performed by: PHYSICIAN ASSISTANT

## 2024-02-09 PROCEDURE — 1160F RVW MEDS BY RX/DR IN RCRD: CPT | Performed by: PHYSICIAN ASSISTANT

## 2024-02-09 PROCEDURE — 1125F AMNT PAIN NOTED PAIN PRSNT: CPT | Performed by: PHYSICIAN ASSISTANT

## 2024-02-09 PROCEDURE — 96372 THER/PROPH/DIAG INJ SC/IM: CPT | Performed by: PHYSICIAN ASSISTANT

## 2024-02-09 PROCEDURE — 82570 ASSAY OF URINE CREATININE: CPT | Mod: 59,WESLAB | Performed by: PHYSICIAN ASSISTANT

## 2024-02-09 PROCEDURE — 1036F TOBACCO NON-USER: CPT | Performed by: PHYSICIAN ASSISTANT

## 2024-02-09 PROCEDURE — 3075F SYST BP GE 130 - 139MM HG: CPT | Performed by: PHYSICIAN ASSISTANT

## 2024-02-09 PROCEDURE — 20605 DRAIN/INJ JOINT/BURSA W/O US: CPT | Performed by: PHYSICIAN ASSISTANT

## 2024-02-09 RX ORDER — GABAPENTIN 300 MG/1
300 CAPSULE ORAL 2 TIMES DAILY
Qty: 60 CAPSULE | Refills: 2 | Status: SHIPPED | OUTPATIENT
Start: 2024-02-09 | End: 2024-05-10 | Stop reason: SDUPTHER

## 2024-02-09 RX ORDER — LIDOCAINE HYDROCHLORIDE 10 MG/ML
2 INJECTION, SOLUTION EPIDURAL; INFILTRATION; INTRACAUDAL; PERINEURAL ONCE
Status: COMPLETED | OUTPATIENT
Start: 2024-02-09 | End: 2024-02-09

## 2024-02-09 RX ORDER — TRIAMCINOLONE ACETONIDE 40 MG/ML
80 INJECTION, SUSPENSION INTRA-ARTICULAR; INTRAMUSCULAR ONCE
Status: COMPLETED | OUTPATIENT
Start: 2024-02-09 | End: 2024-02-09

## 2024-02-09 RX ORDER — TRAMADOL HYDROCHLORIDE 50 MG/1
50 TABLET ORAL EVERY 6 HOURS PRN
Qty: 120 TABLET | Refills: 2 | Status: SHIPPED | OUTPATIENT
Start: 2024-02-09 | End: 2024-05-10 | Stop reason: SDUPTHER

## 2024-02-09 RX ORDER — GABAPENTIN 300 MG/1
300 CAPSULE ORAL 2 TIMES DAILY
Qty: 60 CAPSULE | Refills: 2 | Status: CANCELLED | OUTPATIENT
Start: 2024-02-09 | End: 2024-05-09

## 2024-02-09 RX ORDER — TRAMADOL HYDROCHLORIDE 50 MG/1
50 TABLET ORAL EVERY 6 HOURS PRN
COMMUNITY
End: 2024-02-09 | Stop reason: SDUPTHER

## 2024-02-09 RX ORDER — DIVALPROEX SODIUM 500 MG/1
500 TABLET, DELAYED RELEASE ORAL EVERY 12 HOURS PRN
COMMUNITY
Start: 2024-02-01 | End: 2024-04-19 | Stop reason: SDUPTHER

## 2024-02-09 RX ORDER — TRAMADOL HYDROCHLORIDE 50 MG/1
50 TABLET ORAL EVERY 6 HOURS PRN
Qty: 120 TABLET | Refills: 2 | Status: CANCELLED | OUTPATIENT
Start: 2024-02-09 | End: 2024-05-09

## 2024-02-09 RX ADMIN — LIDOCAINE HYDROCHLORIDE 20 MG: 10 INJECTION, SOLUTION EPIDURAL; INFILTRATION; INTRACAUDAL; PERINEURAL at 10:29

## 2024-02-09 RX ADMIN — TRIAMCINOLONE ACETONIDE 80 MG: 40 INJECTION, SUSPENSION INTRA-ARTICULAR; INTRAMUSCULAR at 10:30

## 2024-02-09 ASSESSMENT — PAIN SCALES - GENERAL: PAINLEVEL_OUTOF10: 8

## 2024-02-09 ASSESSMENT — ENCOUNTER SYMPTOMS
SHORTNESS OF BREATH: 0
PALPITATIONS: 0
DIARRHEA: 0
UNEXPECTED WEIGHT CHANGE: 0
FATIGUE: 0
BACK PAIN: 1
VOICE CHANGE: 0
ACTIVITY CHANGE: 0
SORE THROAT: 0
FEVER: 0
SLEEP DISTURBANCE: 0
NAUSEA: 0
VOMITING: 0
CHEST TIGHTNESS: 0
COUGH: 0
CHILLS: 0

## 2024-02-09 ASSESSMENT — PATIENT HEALTH QUESTIONNAIRE - PHQ9
10. IF YOU CHECKED OFF ANY PROBLEMS, HOW DIFFICULT HAVE THESE PROBLEMS MADE IT FOR YOU TO DO YOUR WORK, TAKE CARE OF THINGS AT HOME, OR GET ALONG WITH OTHER PEOPLE: SOMEWHAT DIFFICULT
1. LITTLE INTEREST OR PLEASURE IN DOING THINGS: SEVERAL DAYS
SUM OF ALL RESPONSES TO PHQ9 QUESTIONS 1 AND 2: 2
2. FEELING DOWN, DEPRESSED OR HOPELESS: SEVERAL DAYS

## 2024-02-09 ASSESSMENT — PAIN - FUNCTIONAL ASSESSMENT: PAIN_FUNCTIONAL_ASSESSMENT: 0-10

## 2024-02-09 ASSESSMENT — PAIN DESCRIPTION - DESCRIPTORS: DESCRIPTORS: PINS AND NEEDLES;ACHING

## 2024-02-09 NOTE — PROGRESS NOTES
Subjective   Patient ID: Kristy Chapin is a 68 y.o. female who presents for Back Pain, Shoulder Pain, and Groin Pain.  Patient is a 68-year-old female with lumbosacral radiculopathy with balance deficits history of tennis elbow history of biceps tendinitis and myofascial pain the presents today for follow-up.  Patient denies that she has been having increased based on the lumbar right upper extremity and right tennis elbow and is requesting steroidal injections.  She has been doing the physical therapy recommended for balance deficits which make her dizzy and almost vomit at times.  She did notes that she has been working with her neurologist to get the migraines under better control but having insurance coverage issuesPatient has been utilizing less of the gabapentin and not noticing any major deficits or changes in her symptoms    Back Pain  Pertinent negatives include no chest pain or fever.   Shoulder Pain   Pertinent negatives include no fever.   Groin Pain  Associated symptoms include back pain. Pertinent negatives include no chills, diarrhea, fever, nausea, sore throat or vomiting.       Review of Systems   Constitutional:  Negative for activity change, chills, fatigue, fever and unexpected weight change.   HENT:  Negative for ear pain, sore throat and voice change.    Eyes:  Negative for visual disturbance.   Respiratory:  Negative for cough, chest tightness and shortness of breath.    Cardiovascular:  Negative for chest pain and palpitations.   Gastrointestinal:  Negative for diarrhea, nausea and vomiting.   Musculoskeletal:  Positive for back pain.   Psychiatric/Behavioral:  Negative for behavioral problems, self-injury, sleep disturbance and suicidal ideas.        Objective   Physical Exam  Vitals reviewed.   Constitutional:       Appearance: Normal appearance.   HENT:      Head: Normocephalic and atraumatic.      Mouth/Throat:      Mouth: Mucous membranes are moist.   Neck:      Vascular: No JVD.    Pulmonary:      Effort: Pulmonary effort is normal. No tachypnea or bradypnea.   Abdominal:      Palpations: Abdomen is soft.   Musculoskeletal:      Right elbow: No effusion or lacerations. Normal range of motion. Tenderness present in medial epicondyle.      Left elbow: No deformity, effusion or lacerations. Normal range of motion. Tenderness present in medial epicondyle.      Lumbar back: Spasms and tenderness present. Decreased range of motion. Positive right straight leg raise test. Negative left straight leg raise test.        Back:       Comments: The right bicipital groove tenderness and also aggravated with resisted elbow flexion, medial epicondyle tender and aggravated by wrist flexion   Skin:     General: Skin is warm and dry.   Neurological:      Mental Status: She is alert and oriented to person, place, and time.   Psychiatric:         Mood and Affect: Mood normal.         Behavior: Behavior normal. Behavior is cooperative.         Assessment/Plan   Problem List Items Addressed This Visit             ICD-10-CM    Balance problem R26.89    Biceps tendonitis, right M75.21    Lumbar radiculopathy - Primary M54.16    Relevant Medications    gabapentin (Neurontin) 300 mg capsule    traMADol (Ultram) 50 mg tablet    Dizziness R42    Medial epicondylitis of elbow M77.00    Myofascial pain M79.18    Migraine without aura and without status migrainosus, not intractable G43.009    Relevant Medications    traMADol (Ultram) 50 mg tablet     Other Visit Diagnoses         Codes    History of ongoing treatment with high-risk medication     Z79.899    Relevant Orders    Opiate/Opioid/Benzo Prescription Compliance    OOB Internal Tracking          I had nice discussion with the patient today our plan will be as follows.      Radiology: [ none at this time ]      Physically:  [ continue modification of activities, healthy lifestyle choice ]      Psychologically:  [ No acute psychological concerns ]      Medication: [I  will refill the medications at the same dose and frequency. We will continue to monitor the patient every 3 months for compliance, adverse reaction or interations The patient continues to see benefit and improvement in their quality of life and ability to maintain ADLs. Patient educated about the risks of taking opioids and operating a motor vehicle. Patient reports no adverse side effects to current medication regimen. Current regimen does allow patient to maintain ADLs. Oarrs has been reviewed. No suspicion of diversion or abuse. Compliance with medication regime, no use of illicit drugs, no sharing of narcotic medications with others, do not use others narcotic medication, and to avoid alcohol use. Patient has been educated on the risks, benefits, and alternatives of controlled substances as well as the proper way to store these medications.   The patient and I discussed the nature of this medication and its side effects. We discussed tolerance, physical dependence, psychological dependence, addiction and opioid-induced hyperalgesia  Patient submit sample for tox screen]      Duration:  [3 months]      Intervention:  [Sterile technique and informed consent the bilateral myofascial pains the right epicondyle I will of the right upper extremity and the right bicipital groove were infiltrated the cocktail each consisting of Kenalog and Xylocaine tolerated the procedures well hemostasis easily achieved post care instructions provided  Myofascial pain 1 cc Kenalog and 1 cc of Xylocaine  Medial epicondyle half cc Kenalog half cc of Xylocaine  Bicipital groove half cc Kenalog half cc of Xylocaine]         Gil Schmitz PA-C 02/09/24 10:08 AM

## 2024-02-09 NOTE — PROGRESS NOTES
MEDICATION NAME: tramadol  STRENGTH: 50 mg  LAST FILL DATE:   DATE LAST TAKEN: 2024  QUANTITY FILLED: 92  QUANTITY REMAININ  COUNT COMPLETED BY: CARLOS JENSEN LPN and KILO Hamilotn      CONTROLLED SUBSTANCES AGREEMENT LAST SIGNED: 2023  ORT LAST COMPLETED: 2023  Modified [employer] disability form filled out annually.    spO2:96

## 2024-02-10 LAB
AMPHETAMINES UR QL SCN: NORMAL
BARBITURATES UR QL SCN: NORMAL
BZE UR QL SCN: NORMAL
CANNABINOIDS UR QL SCN: NORMAL
CREAT UR-MCNC: 85.6 MG/DL (ref 20–320)
PCP UR QL SCN: NORMAL

## 2024-02-16 LAB
1OH-MIDAZOLAM UR CFM-MCNC: <25 NG/ML
6MAM UR CFM-MCNC: <25 NG/ML
7AMINOCLONAZEPAM UR CFM-MCNC: <25 NG/ML
A-OH ALPRAZ UR CFM-MCNC: <25 NG/ML
ALPRAZ UR CFM-MCNC: <25 NG/ML
CHLORDIAZEP UR CFM-MCNC: <25 NG/ML
CLONAZEPAM UR CFM-MCNC: <25 NG/ML
CODEINE UR CFM-MCNC: <50 NG/ML
DIAZEPAM UR CFM-MCNC: <25 NG/ML
EDDP UR CFM-MCNC: <25 NG/ML
FENTANYL UR CFM-MCNC: <2.5 NG/ML
HYDROCODONE CTO UR CFM-MCNC: <25 NG/ML
HYDROMORPHONE UR CFM-MCNC: <25 NG/ML
LORAZEPAM UR CFM-MCNC: <25 NG/ML
METHADONE UR CFM-MCNC: <25 NG/ML
MIDAZOLAM UR CFM-MCNC: <25 NG/ML
MORPHINE UR CFM-MCNC: <50 NG/ML
NORDIAZEPAM UR CFM-MCNC: <25 NG/ML
NORFENTANYL UR CFM-MCNC: <2.5 NG/ML
NORHYDROCODONE UR CFM-MCNC: <25 NG/ML
NOROXYCODONE UR CFM-MCNC: <25 NG/ML
NORTRAMADOL UR-MCNC: >1000 NG/ML
OXAZEPAM UR CFM-MCNC: <25 NG/ML
OXYCODONE UR CFM-MCNC: <25 NG/ML
OXYMORPHONE UR CFM-MCNC: <25 NG/ML
TEMAZEPAM UR CFM-MCNC: <25 NG/ML
TRAMADOL UR CFM-MCNC: >1000 NG/ML
ZOLPIDEM UR CFM-MCNC: <25 NG/ML
ZOLPIDEM UR-MCNC: <25 NG/ML

## 2024-02-27 RX ORDER — LIDOCAINE HYDROCHLORIDE 10 MG/ML
2 INJECTION, SOLUTION EPIDURAL; INFILTRATION; INTRACAUDAL; PERINEURAL ONCE
Status: SHIPPED | OUTPATIENT
Start: 2024-02-27

## 2024-02-27 RX ORDER — TRIAMCINOLONE ACETONIDE 40 MG/ML
80 INJECTION, SUSPENSION INTRA-ARTICULAR; INTRAMUSCULAR ONCE
Status: SHIPPED | OUTPATIENT
Start: 2024-02-27

## 2024-03-31 NOTE — PROGRESS NOTES
HPI:       Hypertension:          The patient has no issues except her anxiety and depression have worsened. She is becoming more reclusive and not wanting to socialize.  Not blaming the weather but more on her medical issues: dizziness         The patients cardiovascular risk factors include:         Cardiac Risk Factors  Age > 45-male, > 55-female:  YES  +1   Smoking:   NO   Sig. family hx of CHD*:  NO   Hypertension:   YES  +1   Diabetes:   NO   HDL < 35:   NO   HDL > 59:   YES  -1   Total: 1     *- Sig. family h/o CHD per NCEP = MI or sudden death at <56yo in   father or other 1st-degree male relative, or <64yo in mother or   other 1st-degree female relative           The patients adherence to the treatment regimen is Excellent         Responses to the medications has been Excellent    Hyperlipidemia:   The patient does not use medications that may worsen dyslipidemias (corticosteroids, progestins, anabolic steroids, diuretics, beta-blockers, amiodarone, cyclosporine, olanzapine). The patient exercises occasionally.  The patient is not known to have coexisting coronary artery disease.    MEDICATIONS:   Current Outpatient Medications   Medication Sig Dispense Refill    aspirin 81 mg EC tablet Take 1 tablet (81 mg) by mouth once daily.      atorvastatin (Lipitor) 40 mg tablet Take 1 tablet (40 mg) by mouth once daily.      co-enzyme Q-10 (CoQ-10) 30 mg capsule Take 1 capsule (30 mg) by mouth once daily.      fremanezumab (Ajovy Autoinjector) 225 mg/1.5 mL auto-injector Inject 1 Pen (225 mg) under the skin every 30 (thirty) days.      gabapentin (Neurontin) 300 mg capsule Take 1 capsule (300 mg) by mouth 2 times a day. 60 capsule 2    SUMAtriptan (Imitrex) 100 mg tablet Take 1 tablet (100 mg) by mouth.      traMADol (Ultram) 50 mg tablet Take 1 tablet (50 mg) by mouth every 6 hours if needed for moderate pain (4 - 6) or severe pain (7 - 10). 120 tablet 2    traMADol (Ultram) 50 mg tablet Take 1 tablet (50 mg) by  mouth every 6 hours if needed for severe pain (7 - 10). 120 tablet 2    busPIRone (Buspar) 5 mg tablet Take 2 tablets (10 mg) by mouth 2 times a day. 120 tablet 0    sertraline (Zoloft) 100 mg tablet Take 1.5 tablets (150 mg) by mouth once daily. 135 tablet 0    valsartan (Diovan) 80 mg tablet Take 1 tablet (80 mg) by mouth once daily. 90 tablet 1     Current Facility-Administered Medications   Medication Dose Route Frequency Provider Last Rate Last Admin    lidocaine PF (Xylocaine) 10 mg/mL (1 %) injection 20 mg  2 mL infiltration Once Gil Schmitz PA-C        triamcinolone acetonide (Kenalog-40) injection 80 mg  80 mg intramuscular Once Gil Schmitz PA-C         ALLERGIES:   Allergies   Allergen Reactions    Duloxetine Other and Palpitations     nausea    Oxycodone-Acetaminophen Other     extreme fatigue     MEDICAL HISTORY:   Past Medical History:   Diagnosis Date    Allergic ??? 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    Dyslipidemia     10 yr risk 3.6% 2018    History of COVID-19 12/2020    Hypertension     Joint pain 2011    Low back pain 2009    Lumbosacral disc disease 2012    Migraines     [facility]    Neck pain 2022    Osteoarthritis 2012    Peptic ulceration 2000    Peripheral neuropathy 2015    SVT (supraventricular tachycardia) (CMS-[facility])     Urinary tract infection 74255     FAMILY HISTORY:   Family History   Problem Relation Name Age of Onset    Stroke Mother Luave     Hypertension Mother Luave     Diabetes Mother Luave     Migraines Mother Luave     Depression Mother Luave     Miscarriages / Stillbirths Mother Luave     Blood clot Father Poppy     Other (black lung) Father Poppy     Arthritis Father Poppy     Cancer Father Poppy     COPD Father Poppy      Alcohol abuse Father Poppy     Heart disease Father Poppy     Atrial fibrillation Sister Berd     Breast cancer Sister Berd     Cancer Sister Berd     Breast cancer Sister Berdie     Alzheimer's disease Sister Berdie     Atrial fibrillation Sister Berdie     Cancer Sister Berdie     Other (pacemaker) Brother      Other (htn induced brain bleed) Son      Dementia Other sister     Heart attack Other sister     Diabetes Other sister     Alzheimer's disease Father's Sister Joe     Cancer Sister Mar     Depression Sister Mar     Developmental delay Sister Mar     Breast cancer Sister Mar     Learning disabilities Sister Mar     Mental illness Sister Mar     Cancer Sister Peg     Depression Sister Peg     Alcohol abuse Sister Fernanda     Cancer Sister Fernanda     Depression Sister Fernanda     Ovarian cancer Sister Fernanda     Atrial fibrillation Brother Errol     Diabetes Brother Errol     Heart disease Brother Errol      SOCIAL HISTORY:   Social History     Tobacco Use    Smoking status: Former     Current packs/day: 0.00     Average packs/day: 4.0 packs/day for 53.9 years (215.7 ttl pk-yrs)     Types: Cigarettes, Cigars     Start date: 1967     Quit date: 2014     Years since quittin.3    Smokeless tobacco: Never    Tobacco comments:     3 to 4 pks a day   Vaping Use    Vaping status: Never Used   Substance Use Topics    Alcohol use: Not Currently    Drug use: Never     Types: Other         ROS:      CONSTITUTION:  alert and oriented     OPHTHALMOLOGY:          no Change in vision.         CARDIOLOGY:          no Chest pain.  no Dyspnea on exertion.  no Shortness of breath.         ENDOCRINOLOGY:          no Excessive thirst.  no Excessive urination.  no Fatigue.  no Skin changes.  no Weight gain.  no Weight loss.         SKIN:          no Healing problems.         NEUROLOGY:          no Loss of sensation in specific body area.  no Burning pain in feet.  no Peripheral neuropathy.  no Tingling/numbness.     LABS:  Lab Results   Component Value Date    GLUCOSE 115 (H) 10/30/2023    CALCIUM 9.9 10/30/2023     10/30/2023    K 4.5 10/30/2023    CO2 27 10/30/2023     10/30/2023    BUN 8 10/30/2023    CREATININE 0.74 10/30/2023     Lab Results   Component Value Date    CHOL 178 10/30/2023    CHOL 258 (H) 11/19/2021    CHOL 234 (H) 11/13/2020     Lab Results   Component Value Date    HDL 87.4 10/30/2023    HDL 93 11/19/2021    HDL 85 11/13/2020     Lab Results   Component Value Date    LDLCALC 70 10/30/2023    LDLCALC 153 (H) 11/19/2021    LDLCALC 134 (H) 11/13/2020     Lab Results   Component Value Date    TRIG 102 10/30/2023    TRIG 58 11/19/2021    TRIG 76 11/13/2020      Latest Reference Range & Units 10/30/23 15:52   Albumin, Urine Random Not established mg/L <7.0   Creatinine, Urine Random 20.0 - 320.0 mg/dL 48.1   Albumin/Creatine Ratio  COMMENT ONLY          VITAL SIGNS:  /76   Pulse 75   Wt 89.2 kg (196 lb 9.6 oz)   SpO2 95%   BMI 34.83 kg/m²     General Appearance: awake, alert, oriented, in no acute distress  Lungs: Normal expansion.  Clear to auscultation.  No rales, rhonchi, or wheezing.  Heart: Heart sounds are normal.  Regular rate and rhythm without murmur, gallop or rub.  Extremities: Extremities warm to touch, pink, with no edema.  Neurologic: Alert and oriented x 3, gait normal., reflexes normal and symmetric, strength and  sensation grossly normal    Kristy was seen today for hypertension.  Diagnoses and all orders for this visit:  Primary hypertension (Primary)  -     Follow Up In Primary Care - Established  -     valsartan (Diovan) 80 mg tablet; Take 1 tablet (80 mg) by mouth once daily.  -     Follow Up In Primary Care - Established; Future  Mixed hyperlipidemia  Comments:  cont atorvastatin  Depression with anxiety  -     sertraline (Zoloft) 100 mg tablet; Take 1.5 tablets (150 mg) by mouth once daily.  Breast cancer screening by mammogram  -     BI mammo bilateral screening  tomosynthesis; Future  Former heavy tobacco smoker  -     CT lung screening low dose; Future  Other orders  -     busPIRone (Buspar) 5 mg tablet; Take 2 tablets (10 mg) by mouth 2 times a day.

## 2024-04-03 ENCOUNTER — TELEPHONE (OUTPATIENT)
Dept: PRIMARY CARE | Facility: CLINIC | Age: 69
End: 2024-04-03
Payer: COMMERCIAL

## 2024-04-03 DIAGNOSIS — Z87.891 HISTORY OF TOBACCO USE: ICD-10-CM

## 2024-04-03 NOTE — TELEPHONE ENCOUNTER
Per [facility] records, patient is a former smoker and does not have Hx of low does lung CT.   Order pended for signature

## 2024-04-19 ENCOUNTER — OFFICE VISIT (OUTPATIENT)
Dept: PRIMARY CARE | Facility: CLINIC | Age: 69
End: 2024-04-19
Payer: COMMERCIAL

## 2024-04-19 VITALS
HEART RATE: 75 BPM | WEIGHT: 196.6 LBS | SYSTOLIC BLOOD PRESSURE: 134 MMHG | OXYGEN SATURATION: 95 % | BODY MASS INDEX: 34.83 KG/M2 | DIASTOLIC BLOOD PRESSURE: 76 MMHG

## 2024-04-19 DIAGNOSIS — E78.2 MIXED HYPERLIPIDEMIA: ICD-10-CM

## 2024-04-19 DIAGNOSIS — Z87.891 FORMER HEAVY TOBACCO SMOKER: ICD-10-CM

## 2024-04-19 DIAGNOSIS — F41.8 DEPRESSION WITH ANXIETY: ICD-10-CM

## 2024-04-19 DIAGNOSIS — Z12.31 BREAST CANCER SCREENING BY MAMMOGRAM: ICD-10-CM

## 2024-04-19 DIAGNOSIS — I10 PRIMARY HYPERTENSION: Primary | ICD-10-CM

## 2024-04-19 PROBLEM — U07.1 COVID-19: Status: RESOLVED | Noted: 2022-12-02 | Resolved: 2024-04-19

## 2024-04-19 PROBLEM — R07.9 CHEST PAIN: Status: RESOLVED | Noted: 2022-12-02 | Resolved: 2024-04-19

## 2024-04-19 PROCEDURE — 1125F AMNT PAIN NOTED PAIN PRSNT: CPT | Performed by: FAMILY MEDICINE

## 2024-04-19 PROCEDURE — 1124F ACP DISCUSS-NO DSCNMKR DOCD: CPT | Performed by: FAMILY MEDICINE

## 2024-04-19 PROCEDURE — 3078F DIAST BP <80 MM HG: CPT | Performed by: FAMILY MEDICINE

## 2024-04-19 PROCEDURE — 3075F SYST BP GE 130 - 139MM HG: CPT | Performed by: FAMILY MEDICINE

## 2024-04-19 PROCEDURE — 1036F TOBACCO NON-USER: CPT | Performed by: FAMILY MEDICINE

## 2024-04-19 PROCEDURE — 99214 OFFICE O/P EST MOD 30 MIN: CPT | Performed by: FAMILY MEDICINE

## 2024-04-19 PROCEDURE — 1159F MED LIST DOCD IN RCRD: CPT | Performed by: FAMILY MEDICINE

## 2024-04-19 PROCEDURE — 1160F RVW MEDS BY RX/DR IN RCRD: CPT | Performed by: FAMILY MEDICINE

## 2024-04-19 RX ORDER — SERTRALINE HYDROCHLORIDE 100 MG/1
150 TABLET, FILM COATED ORAL DAILY
Qty: 135 TABLET | Refills: 0 | Status: SHIPPED | OUTPATIENT
Start: 2024-04-19 | End: 2024-07-18

## 2024-04-19 RX ORDER — VALSARTAN 80 MG/1
80 TABLET ORAL DAILY
Qty: 90 TABLET | Refills: 1 | Status: SHIPPED | OUTPATIENT
Start: 2024-04-19 | End: 2024-10-16

## 2024-04-19 RX ORDER — BUSPIRONE HYDROCHLORIDE 5 MG/1
10 TABLET ORAL 2 TIMES DAILY
Qty: 120 TABLET | Refills: 0 | Status: SHIPPED | OUTPATIENT
Start: 2024-04-19 | End: 2024-06-03 | Stop reason: SDUPTHER

## 2024-04-19 ASSESSMENT — ENCOUNTER SYMPTOMS
DEPRESSION: 0
LOSS OF SENSATION IN FEET: 0
OCCASIONAL FEELINGS OF UNSTEADINESS: 0

## 2024-04-19 ASSESSMENT — PATIENT HEALTH QUESTIONNAIRE - PHQ9
2. FEELING DOWN, DEPRESSED OR HOPELESS: NOT AT ALL
SUM OF ALL RESPONSES TO PHQ9 QUESTIONS 1 AND 2: 0
1. LITTLE INTEREST OR PLEASURE IN DOING THINGS: NOT AT ALL

## 2024-04-19 ASSESSMENT — PAIN SCALES - GENERAL: PAINLEVEL: 7

## 2024-04-27 ENCOUNTER — HOSPITAL ENCOUNTER (OUTPATIENT)
Dept: RADIOLOGY | Facility: HOSPITAL | Age: 69
Discharge: HOME | End: 2024-04-27
Payer: COMMERCIAL

## 2024-04-27 VITALS — HEIGHT: 62 IN | BODY MASS INDEX: 35.96 KG/M2

## 2024-04-27 DIAGNOSIS — Z12.31 BREAST CANCER SCREENING BY MAMMOGRAM: ICD-10-CM

## 2024-04-27 PROCEDURE — 77067 SCR MAMMO BI INCL CAD: CPT

## 2024-04-27 PROCEDURE — 77063 BREAST TOMOSYNTHESIS BI: CPT | Performed by: RADIOLOGY

## 2024-04-27 PROCEDURE — 77067 SCR MAMMO BI INCL CAD: CPT | Performed by: RADIOLOGY

## 2024-05-07 ENCOUNTER — HOSPITAL ENCOUNTER (OUTPATIENT)
Dept: RADIOLOGY | Facility: HOSPITAL | Age: 69
Discharge: HOME | End: 2024-05-07
Payer: COMMERCIAL

## 2024-05-07 DIAGNOSIS — Z87.891 FORMER HEAVY TOBACCO SMOKER: ICD-10-CM

## 2024-05-07 PROCEDURE — 71271 CT THORAX LUNG CANCER SCR C-: CPT

## 2024-05-09 PROBLEM — F41.8 MIXED ANXIETY DEPRESSIVE DISORDER: Status: ACTIVE | Noted: 2024-05-09

## 2024-05-09 PROBLEM — R94.31 ABNORMAL ELECTROCARDIOGRAPHY: Status: ACTIVE | Noted: 2022-11-25

## 2024-05-10 ENCOUNTER — OFFICE VISIT (OUTPATIENT)
Dept: PAIN MEDICINE | Facility: CLINIC | Age: 69
End: 2024-05-10
Payer: COMMERCIAL

## 2024-05-10 VITALS
HEIGHT: 62 IN | RESPIRATION RATE: 22 BRPM | OXYGEN SATURATION: 98 % | DIASTOLIC BLOOD PRESSURE: 78 MMHG | WEIGHT: 200 LBS | BODY MASS INDEX: 36.8 KG/M2 | SYSTOLIC BLOOD PRESSURE: 146 MMHG | HEART RATE: 74 BPM

## 2024-05-10 DIAGNOSIS — M54.16 LUMBAR RADICULOPATHY: ICD-10-CM

## 2024-05-10 DIAGNOSIS — M79.18 MYOFASCIAL PAIN: ICD-10-CM

## 2024-05-10 DIAGNOSIS — R26.89 BALANCE PROBLEMS: ICD-10-CM

## 2024-05-10 DIAGNOSIS — M75.21 BILATERAL BICIPITAL TENOSYNOVITIS: Primary | ICD-10-CM

## 2024-05-10 DIAGNOSIS — G43.009 MIGRAINE WITHOUT AURA AND WITHOUT STATUS MIGRAINOSUS, NOT INTRACTABLE: ICD-10-CM

## 2024-05-10 DIAGNOSIS — M75.22 BILATERAL BICIPITAL TENOSYNOVITIS: Primary | ICD-10-CM

## 2024-05-10 DIAGNOSIS — M77.01 MEDIAL EPICONDYLITIS OF RIGHT ELBOW: ICD-10-CM

## 2024-05-10 PROCEDURE — 96372 THER/PROPH/DIAG INJ SC/IM: CPT | Performed by: PHYSICIAN ASSISTANT

## 2024-05-10 PROCEDURE — 3078F DIAST BP <80 MM HG: CPT | Performed by: PHYSICIAN ASSISTANT

## 2024-05-10 PROCEDURE — 1036F TOBACCO NON-USER: CPT | Performed by: PHYSICIAN ASSISTANT

## 2024-05-10 PROCEDURE — 20551 NJX 1 TENDON ORIGIN/INSJ: CPT | Performed by: PHYSICIAN ASSISTANT

## 2024-05-10 PROCEDURE — 1160F RVW MEDS BY RX/DR IN RCRD: CPT | Performed by: PHYSICIAN ASSISTANT

## 2024-05-10 PROCEDURE — 20552 NJX 1/MLT TRIGGER POINT 1/2: CPT | Performed by: PHYSICIAN ASSISTANT

## 2024-05-10 PROCEDURE — 2500000004 HC RX 250 GENERAL PHARMACY W/ HCPCS (ALT 636 FOR OP/ED): Performed by: PHYSICIAN ASSISTANT

## 2024-05-10 PROCEDURE — 99214 OFFICE O/P EST MOD 30 MIN: CPT | Performed by: PHYSICIAN ASSISTANT

## 2024-05-10 PROCEDURE — 20550 NJX 1 TENDON SHEATH/LIGAMENT: CPT | Performed by: PHYSICIAN ASSISTANT

## 2024-05-10 PROCEDURE — 1159F MED LIST DOCD IN RCRD: CPT | Performed by: PHYSICIAN ASSISTANT

## 2024-05-10 PROCEDURE — 2500000005 HC RX 250 GENERAL PHARMACY W/O HCPCS: Performed by: PHYSICIAN ASSISTANT

## 2024-05-10 PROCEDURE — 3077F SYST BP >= 140 MM HG: CPT | Performed by: PHYSICIAN ASSISTANT

## 2024-05-10 PROCEDURE — 1125F AMNT PAIN NOTED PAIN PRSNT: CPT | Performed by: PHYSICIAN ASSISTANT

## 2024-05-10 RX ORDER — TRIAMCINOLONE ACETONIDE 40 MG/ML
80 INJECTION, SUSPENSION INTRA-ARTICULAR; INTRAMUSCULAR ONCE
Status: COMPLETED | OUTPATIENT
Start: 2024-05-10 | End: 2024-05-10

## 2024-05-10 RX ORDER — GABAPENTIN 300 MG/1
300 CAPSULE ORAL 2 TIMES DAILY
Qty: 60 CAPSULE | Refills: 0 | OUTPATIENT
Start: 2024-05-10

## 2024-05-10 RX ORDER — TRAMADOL HYDROCHLORIDE 50 MG/1
50 TABLET ORAL EVERY 6 HOURS PRN
Qty: 120 TABLET | Refills: 2 | Status: SHIPPED | OUTPATIENT
Start: 2024-05-10 | End: 2024-08-08

## 2024-05-10 RX ORDER — GABAPENTIN 300 MG/1
300 CAPSULE ORAL 2 TIMES DAILY
Qty: 60 CAPSULE | Refills: 2 | Status: SHIPPED | OUTPATIENT
Start: 2024-05-10 | End: 2024-08-08

## 2024-05-10 RX ORDER — LIDOCAINE HYDROCHLORIDE 10 MG/ML
2 INJECTION, SOLUTION EPIDURAL; INFILTRATION; INTRACAUDAL; PERINEURAL ONCE
Status: COMPLETED | OUTPATIENT
Start: 2024-05-10 | End: 2024-05-10

## 2024-05-10 RX ADMIN — LIDOCAINE HYDROCHLORIDE 20 MG: 10 INJECTION, SOLUTION EPIDURAL; INFILTRATION; INTRACAUDAL; PERINEURAL at 08:49

## 2024-05-10 RX ADMIN — TRIAMCINOLONE ACETONIDE 80 MG: 40 INJECTION, SUSPENSION INTRA-ARTICULAR; INTRAMUSCULAR at 08:50

## 2024-05-10 ASSESSMENT — ENCOUNTER SYMPTOMS
COUGH: 0
DIARRHEA: 0
SORE THROAT: 0
CHEST TIGHTNESS: 0
VOMITING: 0
UNEXPECTED WEIGHT CHANGE: 0
SHORTNESS OF BREATH: 0
CHILLS: 0
FEVER: 0
PALPITATIONS: 0
FATIGUE: 0
ACTIVITY CHANGE: 0
BACK PAIN: 1
PAIN: 1
NAUSEA: 0
VOICE CHANGE: 0
SLEEP DISTURBANCE: 0

## 2024-05-10 ASSESSMENT — LIFESTYLE VARIABLES
AUDIT-C TOTAL SCORE: 0
HOW OFTEN DO YOU HAVE SIX OR MORE DRINKS ON ONE OCCASION: NEVER
HOW MANY STANDARD DRINKS CONTAINING ALCOHOL DO YOU HAVE ON A TYPICAL DAY: PATIENT DOES NOT DRINK
HOW OFTEN DO YOU HAVE A DRINK CONTAINING ALCOHOL: NEVER
SKIP TO QUESTIONS 9-10: 1

## 2024-05-10 ASSESSMENT — PAIN SCALES - GENERAL
PAINLEVEL_OUTOF10: 8
PAINLEVEL: 6

## 2024-05-10 ASSESSMENT — PAIN - FUNCTIONAL ASSESSMENT: PAIN_FUNCTIONAL_ASSESSMENT: 0-10

## 2024-05-10 ASSESSMENT — PATIENT HEALTH QUESTIONNAIRE - PHQ9
SUM OF ALL RESPONSES TO PHQ9 QUESTIONS 1 & 2: 0
1. LITTLE INTEREST OR PLEASURE IN DOING THINGS: NOT AT ALL
2. FEELING DOWN, DEPRESSED OR HOPELESS: NOT AT ALL

## 2024-05-10 ASSESSMENT — PAIN DESCRIPTION - DESCRIPTORS: DESCRIPTORS: ACHING

## 2024-05-10 NOTE — PROGRESS NOTES
Subjective   Patient ID: Kristy Chapin is a 69 y.o. female who presents for Pain (Right shoulder).  69-year-old female with lumbar radiculopathy medial epicondylitis myofascial pain migraines bicipital tendinitis the presents today for follow-up.  Her right upper extremity tendinitis and elbow epicondylitis have been severely aggravated.  She did notes that her lower lumbar myofascial pains have been increased.  Patient denotes that generally injections do provide 50 to 60% reduction of symptoms greater than 7 weeks.  Reviewed of management of my fascial exercises heat ice topical do not provide durable relief patient continues to have radiating symptoms intermittently to lower extremities.  Her balance and memory deficits are still being worked up by the neurologist.  She denies any falls she utilizes a cart at work to help stabilize her gait    Pain  Pertinent negatives include no chest pain, diarrhea, fatigue, fever, nausea, shortness of breath or vomiting.       Review of Systems   Constitutional:  Negative for activity change, chills, fatigue, fever and unexpected weight change.   HENT:  Negative for ear pain, sore throat and voice change.    Eyes:  Negative for visual disturbance.   Respiratory:  Negative for cough, chest tightness and shortness of breath.    Cardiovascular:  Negative for chest pain and palpitations.   Gastrointestinal:  Negative for diarrhea, nausea and vomiting.   Musculoskeletal:  Positive for back pain.   Psychiatric/Behavioral:  Negative for behavioral problems, self-injury, sleep disturbance and suicidal ideas.        Objective   Physical Exam  Vitals reviewed.   Constitutional:       Appearance: Normal appearance.   HENT:      Head: Normocephalic and atraumatic.      Mouth/Throat:      Mouth: Mucous membranes are moist.   Neck:      Vascular: No JVD.   Pulmonary:      Effort: Pulmonary effort is normal. No tachypnea or bradypnea.   Abdominal:      Palpations: Abdomen is soft.    Musculoskeletal:      Right elbow: No effusion or lacerations. Normal range of motion. Tenderness present in medial epicondyle.      Left elbow: No deformity, effusion or lacerations. Normal range of motion. Tenderness present in medial epicondyle.      Lumbar back: Spasms and tenderness present. Decreased range of motion. Positive right straight leg raise test. Negative left straight leg raise test.        Back:       Comments: The right bicipital groove tenderness and also aggravated with resisted elbow flexion, medial epicondyle tender and aggravated by wrist flexion   Skin:     General: Skin is warm and dry.   Neurological:      Mental Status: She is alert and oriented to person, place, and time.   Psychiatric:         Mood and Affect: Mood normal.         Behavior: Behavior normal. Behavior is cooperative.       Assessment/Plan   Problem List Items Addressed This Visit             ICD-10-CM    Lumbar radiculopathy M54.16    Medial epicondylitis of elbow M77.00    Myofascial pain M79.18    Migraine without aura and without status migrainosus, not intractable G43.009    Bicipital tenosynovitis - Primary M75.20   I had nice discussion with the patient today our plan will be as follows.      Radiology: [ none at this time ]      Physically:  [ continue modification of activities, healthy lifestyle choice ]      Psychologically:  [ No acute psychological concerns ]      Medication: [I will refill the medications at the same dose and frequency. We will continue to monitor the patient every 3 months for compliance, adverse reaction or interations The patient continues to see benefit and improvement in their quality of life and ability to maintain ADLs. Patient educated about the risks of taking opioids and operating a motor vehicle. Patient reports no adverse side effects to current medication regimen. Current regimen does allow patient to maintain ADLs. Oarrs has been reviewed. No suspicion of diversion or abuse.  Compliance with medication regime, no use of illicit drugs, no sharing of narcotic medications with others, do not use others narcotic medication, and to avoid alcohol use. Patient has been educated on the risks, benefits, and alternatives of controlled substances as well as the proper way to store these medications.   The patient and I discussed the nature of this medication and its side effects. We discussed tolerance, physical dependence, psychological dependence, addiction and opioid-induced hyperalgesia ]      Duration:  [ 3 months ]      Intervention:  [Under sterile technique and informed consent consent form signed the right medial epicondyle right bicipital groove and bilateral lumbar myofascial regions were infiltrated the cocktail consisting Kenalog and Xylocaine.  Patient tolerated the procedures well hemostasis was easily achieved post care instructions were provided patient advised take it easy for the next 72 hours    Myofascial injections 1 cc Kenalog 1 cc of Xylocaine  Bicipital tendinitis injection half cc of Xylocaine half cc of Kenalog  To condyle injection half cc Kenalog half cc of Xylocaine    Providing patient with a cane for assistance with her ambulation until her neurologist gets her in her ear/balance deficits under better control.]           Gil Schmitz PA-C 05/10/24 7:57 AM

## 2024-06-03 DIAGNOSIS — F41.9 ANXIETY: Primary | ICD-10-CM

## 2024-06-03 RX ORDER — BUSPIRONE HYDROCHLORIDE 5 MG/1
10 TABLET ORAL 2 TIMES DAILY
Qty: 120 TABLET | Refills: 5 | Status: SHIPPED | OUTPATIENT
Start: 2024-06-03

## 2024-07-12 ENCOUNTER — OFFICE VISIT (OUTPATIENT)
Dept: PAIN MEDICINE | Facility: CLINIC | Age: 69
End: 2024-07-12
Payer: COMMERCIAL

## 2024-07-12 VITALS
WEIGHT: 200 LBS | HEIGHT: 62 IN | HEART RATE: 77 BPM | SYSTOLIC BLOOD PRESSURE: 124 MMHG | RESPIRATION RATE: 16 BRPM | OXYGEN SATURATION: 98 % | BODY MASS INDEX: 36.8 KG/M2 | DIASTOLIC BLOOD PRESSURE: 80 MMHG

## 2024-07-12 DIAGNOSIS — G43.009 MIGRAINE WITHOUT AURA AND WITHOUT STATUS MIGRAINOSUS, NOT INTRACTABLE: ICD-10-CM

## 2024-07-12 DIAGNOSIS — M54.16 LUMBAR RADICULOPATHY: ICD-10-CM

## 2024-07-12 PROCEDURE — 3074F SYST BP LT 130 MM HG: CPT | Performed by: PHYSICIAN ASSISTANT

## 2024-07-12 PROCEDURE — 99214 OFFICE O/P EST MOD 30 MIN: CPT | Performed by: PHYSICIAN ASSISTANT

## 2024-07-12 PROCEDURE — 1159F MED LIST DOCD IN RCRD: CPT | Performed by: PHYSICIAN ASSISTANT

## 2024-07-12 PROCEDURE — 1125F AMNT PAIN NOTED PAIN PRSNT: CPT | Performed by: PHYSICIAN ASSISTANT

## 2024-07-12 PROCEDURE — 3079F DIAST BP 80-89 MM HG: CPT | Performed by: PHYSICIAN ASSISTANT

## 2024-07-12 PROCEDURE — 1036F TOBACCO NON-USER: CPT | Performed by: PHYSICIAN ASSISTANT

## 2024-07-12 PROCEDURE — 1160F RVW MEDS BY RX/DR IN RCRD: CPT | Performed by: PHYSICIAN ASSISTANT

## 2024-07-12 ASSESSMENT — ENCOUNTER SYMPTOMS
UNEXPECTED WEIGHT CHANGE: 0
PALPITATIONS: 0
ARTHRALGIAS: 1
COUGH: 0
SHORTNESS OF BREATH: 0
FATIGUE: 0
DIARRHEA: 0
BACK PAIN: 1
MYALGIAS: 1
CHILLS: 0
VOMITING: 0
NAUSEA: 0
ACTIVITY CHANGE: 0
CHEST TIGHTNESS: 0
VOICE CHANGE: 0
FEVER: 0
SORE THROAT: 0
SLEEP DISTURBANCE: 0

## 2024-07-12 ASSESSMENT — PAIN SCALES - GENERAL
PAINLEVEL_OUTOF10: 7
PAINLEVEL: 7

## 2024-07-12 ASSESSMENT — PAIN - FUNCTIONAL ASSESSMENT: PAIN_FUNCTIONAL_ASSESSMENT: 0-10

## 2024-07-12 ASSESSMENT — PAIN DESCRIPTION - DESCRIPTORS: DESCRIPTORS: ACHING

## 2024-07-12 NOTE — PROGRESS NOTES
MEDICATION NAME: tramadol  STRENGTH: 50mg  LAST FILL DATE: 24  DATE LAST TAKEN: 24  QUANTITY FILLED: 120  QUANTITY REMAININ  COUNT COMPLETED BY: JEN PETER RN and DYLAN VILLALBA      UDS LAST COMPLETED:   CONTROLLED SUBSTANCES AGREEMENT LAST SIGNED:   ORT LAST COMPLETED:2023  Modified [employer] disability form filled out annually.

## 2024-07-12 NOTE — PROGRESS NOTES
Subjective   Patient ID: Kristy Chapin is a 69 y.o. female who presents for Back Pain, Shoulder Pain, and Elbow Pain.  Patient is a 69-year-old female with lumbosacral radiculopathy migraines history of myofascial pain history of tennis elbow and bicipital tendinitis the presents today for follow-up.  Patient denotes that her elbows and tendinitis of her shoulder have been being less aggravating her spasms have returned to her lumbar region and she is hoping that she can get injections of her back today.  These injections of the lower back region tend to provide greater than 60% for relief for at least 2-1/2 months.  She did notes that the spasms have not really responding to heat or physical therapy exercises modification of activities or topical medications.  She continues to have the low back pain and the radiating symptoms do very predicated on her activities.  She did notes that the since the starting of the Ajovy medication she has not had but 1 or 2 migraines a month    Back Pain  Pertinent negatives include no chest pain or fever.   Shoulder Pain   Pertinent negatives include no fever.   Elbow Pain  Associated symptoms include arthralgias and myalgias. Pertinent negatives include no chest pain, chills, coughing, fatigue, fever, nausea, sore throat or vomiting.       Review of Systems   Constitutional:  Negative for activity change, chills, fatigue, fever and unexpected weight change.   HENT:  Negative for ear pain, sore throat and voice change.    Eyes:  Negative for visual disturbance.   Respiratory:  Negative for cough, chest tightness and shortness of breath.    Cardiovascular:  Negative for chest pain and palpitations.   Gastrointestinal:  Negative for diarrhea, nausea and vomiting.   Musculoskeletal:  Positive for arthralgias, back pain and myalgias.   Psychiatric/Behavioral:  Negative for behavioral problems, self-injury, sleep disturbance and suicidal ideas.        Objective   Physical Exam  Vitals  reviewed.   Constitutional:       Appearance: Normal appearance.   HENT:      Head: Normocephalic and atraumatic.      Mouth/Throat:      Mouth: Mucous membranes are moist.   Neck:      Vascular: No JVD.   Pulmonary:      Effort: Pulmonary effort is normal. No tachypnea or bradypnea.   Abdominal:      Palpations: Abdomen is soft.   Musculoskeletal:      Right elbow: No effusion or lacerations. Normal range of motion. Tenderness present in medial epicondyle.      Left elbow: No deformity, effusion or lacerations. Normal range of motion. Tenderness present in medial epicondyle.      Lumbar back: Spasms and tenderness present. Decreased range of motion. Positive right straight leg raise test. Negative left straight leg raise test.        Back:       Comments: The right bicipital groove tenderness and also aggravated with resisted elbow flexion, medial epicondyle tender and aggravated by wrist flexion   Skin:     General: Skin is warm and dry.   Neurological:      Mental Status: She is alert and oriented to person, place, and time.   Psychiatric:         Mood and Affect: Mood normal.         Behavior: Behavior normal. Behavior is cooperative.       Assessment/Plan   Problem List Items Addressed This Visit             ICD-10-CM    Lumbar radiculopathy M54.16    Migraine without aura and without status migrainosus, not intractable G43.009   I had nice discussion with the patient today our plan will be as follows.      Radiology: [ none at this time ]      Physically:  [ continue modification of activities, healthy lifestyle choice ]      Psychologically:  [ No acute psychological concerns ]      Medication: [I will refill the medications at the same dose and frequency. We will continue to monitor the patient every 3 months for compliance, adverse reaction or interactions The patient continues to see benefit and improvement in their quality of life and ability to maintain ADLs. Patient educated about the risks of taking  opioids and operating a motor vehicle. Patient reports no adverse side effects to current medication regimen. Current regimen does allow patient to maintain ADLs. Oarrs has been reviewed. No suspicion of diversion or abuse. Compliance with medication regime, no use of illicit drugs, no sharing of narcotic medications with others, do not use others narcotic medication, and to avoid alcohol use. Patient has been educated on the risks, benefits, and alternatives of controlled substances as well as the proper way to store these medications.   The patient and I discussed the nature of this medication and its side effects. We discussed tolerance, physical dependence, psychological dependence, addiction and opioid-induced hyperalgesia ]      Duration:  [ 3 months ]      Intervention:  [I am in a submit for 76325.  The myofascial pain to the paraspinals in the lumbar region with palpation they do cause radiation.  Patient does conservative options including heat stretching exercises and topical OTC medications that do not provide complete relief of the myofascial injections do supplement and provide patient with more improved ability to perform her ADLs and gait]           Gil Schmitz PA-C 07/12/24 9:03 AM

## 2024-08-02 ENCOUNTER — OFFICE VISIT (OUTPATIENT)
Dept: PAIN MEDICINE | Facility: CLINIC | Age: 69
End: 2024-08-02
Payer: COMMERCIAL

## 2024-08-02 VITALS
DIASTOLIC BLOOD PRESSURE: 90 MMHG | HEIGHT: 62 IN | HEART RATE: 83 BPM | RESPIRATION RATE: 18 BRPM | WEIGHT: 200 LBS | BODY MASS INDEX: 36.8 KG/M2 | OXYGEN SATURATION: 95 % | SYSTOLIC BLOOD PRESSURE: 140 MMHG

## 2024-08-02 DIAGNOSIS — M79.18 MYOFASCIAL PAIN: ICD-10-CM

## 2024-08-02 PROCEDURE — 3008F BODY MASS INDEX DOCD: CPT | Performed by: PHYSICIAN ASSISTANT

## 2024-08-02 PROCEDURE — 1125F AMNT PAIN NOTED PAIN PRSNT: CPT | Performed by: PHYSICIAN ASSISTANT

## 2024-08-02 PROCEDURE — 20552 NJX 1/MLT TRIGGER POINT 1/2: CPT | Performed by: PHYSICIAN ASSISTANT

## 2024-08-02 PROCEDURE — 1159F MED LIST DOCD IN RCRD: CPT | Performed by: PHYSICIAN ASSISTANT

## 2024-08-02 PROCEDURE — 2500000005 HC RX 250 GENERAL PHARMACY W/O HCPCS: Performed by: PHYSICIAN ASSISTANT

## 2024-08-02 PROCEDURE — 1160F RVW MEDS BY RX/DR IN RCRD: CPT | Performed by: PHYSICIAN ASSISTANT

## 2024-08-02 PROCEDURE — 1036F TOBACCO NON-USER: CPT | Performed by: PHYSICIAN ASSISTANT

## 2024-08-02 PROCEDURE — 3080F DIAST BP >= 90 MM HG: CPT | Performed by: PHYSICIAN ASSISTANT

## 2024-08-02 PROCEDURE — 3077F SYST BP >= 140 MM HG: CPT | Performed by: PHYSICIAN ASSISTANT

## 2024-08-02 PROCEDURE — 96372 THER/PROPH/DIAG INJ SC/IM: CPT | Performed by: PHYSICIAN ASSISTANT

## 2024-08-02 PROCEDURE — 2500000004 HC RX 250 GENERAL PHARMACY W/ HCPCS (ALT 636 FOR OP/ED): Performed by: PHYSICIAN ASSISTANT

## 2024-08-02 RX ORDER — TRIAMCINOLONE ACETONIDE 40 MG/ML
40 INJECTION, SUSPENSION INTRA-ARTICULAR; INTRAMUSCULAR ONCE
Status: COMPLETED | OUTPATIENT
Start: 2024-08-02 | End: 2024-08-02

## 2024-08-02 RX ORDER — LIDOCAINE HYDROCHLORIDE 10 MG/ML
1 INJECTION, SOLUTION EPIDURAL; INFILTRATION; INTRACAUDAL; PERINEURAL ONCE
Status: COMPLETED | OUTPATIENT
Start: 2024-08-02 | End: 2024-08-02

## 2024-08-02 ASSESSMENT — PATIENT HEALTH QUESTIONNAIRE - PHQ9
2. FEELING DOWN, DEPRESSED OR HOPELESS: NOT AT ALL
SUM OF ALL RESPONSES TO PHQ9 QUESTIONS 1 & 2: 0
1. LITTLE INTEREST OR PLEASURE IN DOING THINGS: NOT AT ALL

## 2024-08-02 ASSESSMENT — LIFESTYLE VARIABLES
HOW MANY STANDARD DRINKS CONTAINING ALCOHOL DO YOU HAVE ON A TYPICAL DAY: PATIENT DOES NOT DRINK
AUDIT-C TOTAL SCORE: 0
SKIP TO QUESTIONS 9-10: 1
HOW OFTEN DO YOU HAVE A DRINK CONTAINING ALCOHOL: NEVER
HOW OFTEN DO YOU HAVE SIX OR MORE DRINKS ON ONE OCCASION: NEVER

## 2024-08-02 ASSESSMENT — ENCOUNTER SYMPTOMS: MYALGIAS: 1

## 2024-08-02 ASSESSMENT — PAIN - FUNCTIONAL ASSESSMENT: PAIN_FUNCTIONAL_ASSESSMENT: 0-10

## 2024-08-02 ASSESSMENT — PAIN SCALES - GENERAL
PAINLEVEL_OUTOF10: 8
PAINLEVEL: 8

## 2024-08-02 ASSESSMENT — PAIN DESCRIPTION - DESCRIPTORS: DESCRIPTORS: ACHING

## 2024-08-02 NOTE — PROGRESS NOTES
Subjective   Patient ID: Kristy Chapin is a 69 y.o. female who presents for Pain.  Is a 69-year-old female with myofascial pain is here today for her approved trigger point injections.  Patient does note that they have been severely bad spasmodic and having difficulty with some of her ADLs.        Review of Systems   Musculoskeletal:  Positive for myalgias.       Objective   Physical Exam  Musculoskeletal:        Back:       Comments: Positive jump response to the lower lumbar regions         Assessment/Plan   Problem List Items Addressed This Visit             ICD-10-CM    Myofascial pain M79.18   Under sterile technique and informed consent the patient's myofascial lumbar trigger points were infiltrated the cocktail consisting 1 cc Kenalog and 1 cc of Xylocaine patient tolerated the procedure well hemostasis was easily achieved and post care instructions were provided patient advised to take it easy for the next 72 hours         Gil Schmitz PA-C 08/02/24 8:33 AM

## 2024-08-03 DIAGNOSIS — M54.16 LUMBAR RADICULOPATHY: ICD-10-CM

## 2024-08-05 DIAGNOSIS — G43.009 MIGRAINE WITHOUT AURA AND WITHOUT STATUS MIGRAINOSUS, NOT INTRACTABLE: ICD-10-CM

## 2024-08-05 DIAGNOSIS — M54.16 LUMBAR RADICULOPATHY: ICD-10-CM

## 2024-08-05 PROBLEM — M75.100 NONTRAUMATIC TEAR OF ROTATOR CUFF: Status: ACTIVE | Noted: 2024-08-05

## 2024-08-05 RX ORDER — GABAPENTIN 300 MG/1
300 CAPSULE ORAL 2 TIMES DAILY
Qty: 60 CAPSULE | Refills: 0 | Status: SHIPPED | OUTPATIENT
Start: 2024-08-05

## 2024-08-05 RX ORDER — TRAMADOL HYDROCHLORIDE 50 MG/1
50 TABLET ORAL EVERY 6 HOURS PRN
Qty: 120 TABLET | Refills: 2 | Status: SHIPPED | OUTPATIENT
Start: 2024-08-05 | End: 2024-11-03

## 2024-08-05 NOTE — PROGRESS NOTES
HPI:  pt here for routine wellness exam w/o any issues; biometric form needs filling.  UTD on colonoscopy, mammogram.  Aged out for paps.  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      atorvastatin (Lipitor) 40 mg tablet Take 1 tablet (40 mg) by mouth once daily.      busPIRone (Buspar) 5 mg tablet Take 2 tablets (10 mg) by mouth 2 times a day. 120 tablet 5    fremanezumab (Ajovy Autoinjector) 225 mg/1.5 mL auto-injector Inject 1 Pen (225 mg) under the skin every 30 (thirty) days.      gabapentin (Neurontin) 300 mg capsule TAKE ONE CAPSULE BY MOUTH TWO TIMES A DAY 60 capsule 0    SUMAtriptan (Imitrex) 100 mg tablet Take 1 tablet (100 mg) by mouth.      traMADol (Ultram) 50 mg tablet Take 1 tablet (50 mg) by mouth every 6 hours if needed for severe pain (7 - 10). 120 tablet 2    valsartan (Diovan) 80 mg tablet Take 1 tablet (80 mg) by mouth once daily. 90 tablet 1    sertraline (Zoloft) 100 mg tablet Take 1.5 tablets (150 mg) by mouth  once daily. 135 tablet 3     Current Facility-Administered Medications   Medication Dose Route Frequency Provider Last Rate Last Admin    lidocaine PF (Xylocaine) 10 mg/mL (1 %) injection 20 mg  2 mL infiltration Once Gil Schmitz PA-C        triamcinolone acetonide (Kenalog-40) injection 80 mg  80 mg intramuscular Once Gil Schmitz PA-C         ALLERGIES:    Allergies   Allergen Reactions    Duloxetine Other and Palpitations     nausea    Oxycodone-Acetaminophen Other     extreme fatigue     SOCIAL HX:    Social History     Tobacco Use    Smoking status: Former     Current packs/day: 0.00     Average packs/day: 4.0 packs/day for 53.9 years (215.7 ttl pk-yrs)     Types: Cigarettes, Cigars     Start date: 1967     Quit date: 2014     Years since quittin.7    Smokeless tobacco: Never    Tobacco comments:     3 to 4 pks a day   Vaping Use    Vaping status: Never Used   Substance Use Topics    Alcohol use: Not Currently     Comment: I don't like drinking    Drug use: Never     FAMILY HX:   Family History   Problem Relation Name Age of Onset    Stroke Mother Luave     Hypertension Mother Luave     Diabetes Mother Luave     Migraines Mother Luave     Depression Mother Luave     Miscarriages / Stillbirths Mother Luave     Osteoporosis Mother Luave     Blood clot Father Poppy     Other (black lung) Father Poppy     Arthritis Father Poppy     Cancer Father Poppy     COPD Father Poppy     Alcohol abuse Father Poppy     Heart disease Father Poppy     Atrial fibrillation Sister Berd     Breast cancer Sister Berd     Cancer Sister Berd     Breast cancer Sister Berdie     Alzheimer's disease Sister Berdie     Atrial fibrillation Sister Berdie     Cancer Sister Berdie     Cancer Sister Mar     Depression Sister Mar     Developmental delay Sister Mar     Breast cancer Sister Mar     Learning disabilities Sister Mar     Mental illness Sister Mar     Cancer Sister Peg     Depression Sister Peg      "Alcohol abuse Sister Peg     Alcohol abuse Sister Fernanda     Cancer Sister Fernanda     Depression Sister Fernanda     Ovarian cancer Sister Fernanda     Other (pacemaker) Brother anusha         passed at 69 from ALS    Atrial fibrillation Brother Errol     Diabetes Brother Errol     Heart disease Brother Errol     Coronary artery disease Brother Reginaldo     Diabetes Brother Reginaldo     Hypertension Brother Reginaldo     Other (htn induced brain bleed) Son      Alzheimer's disease Father's Sister Joe     Dementia Other sister     Heart attack Other sister     Diabetes Other sister        ROS:             CONSTITUTIONAL:          Negative for concerns, fever, chills.         HEENT:          no Difficulty swallowing.  no Hearing loss.  no Poor sense of smell.   No change in vision ; no headaches     CARDIOLOGY:          Chest pain none.  Palpitations none.  Shortness of breath none.         RESPIRATORY:          Negative for persistent cough , wheezing, trouble breathing.         GASTROENTEROLOGY:          Negative for abdominal pain, change in bowel habits.         ENDOCRINOLOGY:          Negative for fatigue, polydipsia, polyuria, weight loss, weight gain, cold intolerance, heat intolerance.         MUSCULOSKELETAL:          Negative for myalgias, joint pain.         DERMATOLOGY:          Negative for rash, bruising, moles.         NEUROLOGY:          Negative for weakness, headache, dizziness.     VITALS: /68   Pulse 80   Ht 1.575 m (5' 2\")   Wt 92 kg (202 lb 12.8 oz)   SpO2 99%   BMI 37.09 kg/m²      PE:  General Appearance: awake, alert, oriented, in no acute distress  Skin: there are no suspicious lesions or rashes of concern  Head/Face: NCAT  Eyes: No gross abnormalities., PERRL, and EOMI  Ears: canals and TMs NI  Mouth/Throat: Mucosa moist, no lesions; pharynx without erythema, edema or exudate.  Neck: neck- supple, no mass, non-tender and Adenopathy- absent  Lungs: Normal expansion.  Clear to auscultation.  No rales, " rhonchi, or wheezing.  Heart: Heart sounds are normal.  Regular rate and rhythm without murmur, gallop or rub.  Abdomen: Soft, non-tender, normal bowel sounds; no bruits, organomegaly or masses.  Extremities: Extremities warm to touch, pink, with no edema.  Neurologic: Alert and oriented x 3, gait normal., reflexes normal and symmetric, strength and  sensation grossly normal    Kristy was seen today for annual exam.  Diagnoses and all orders for this visit:  Well adult exam (Primary)  -     Lipid Panel; Future  Essential (primary) hypertension  Comments:  cont medications; will see her next month for routine htn appt  Orders:  -     Albumin-Creatinine Ratio, Urine Random; Future  -     Basic Metabolic Panel; Future  -     Urinalysis with Reflex Microscopic; Future  Paroxysmal supraventricular tachycardia (CMS-HCC)  Comments:  controlled;  Chronic obstructive pulmonary disease, unspecified COPD type (Multi)  Comments:  on no inhalers at this time  Depression with anxiety  -     sertraline (Zoloft) 100 mg tablet; Take 1.5 tablets (150 mg) by mouth once daily.  Dizziness  Comments:  cont to f/u with neurology

## 2024-08-05 NOTE — TELEPHONE ENCOUNTER
Pt calls asking for refills on gabapentin and ultram. I see gabapentin was sent to pharmacy. Pt has follow up with you on 11/08/2024. Any problems with the Tramadol rx? Please advise.

## 2024-08-26 NOTE — PROGRESS NOTES
HPI:       Hypertension:          The patient has no issues          The patients cardiovascular risk factors include:         Cardiac Risk Factors  Age > 45-male, > 55-female:  YES  +1   Smoking:   NO   Sig. family hx of CHD*:  NO   Hypertension:   YES  +1   Diabetes:   NO   HDL < 35:   NO   HDL > 59:   YES  -1   Total: 1     *- Sig. family h/o CHD per NCEP = MI or sudden death at <54yo in   father or other 1st-degree male relative, or <66yo in mother or   other 1st-degree female relative           The patients adherence to the treatment regimen is Excellent         Responses to the medications has been Excellent    Hyperlipidemia:   The patient does not use medications that may worsen dyslipidemias (corticosteroids, progestins, anabolic steroids, diuretics, beta-blockers, amiodarone, cyclosporine, olanzapine). The patient exercises occasionally.  The patient is not known to have coexisting coronary artery disease.    MEDICATIONS:   Current Outpatient Medications   Medication Sig Dispense Refill    aspirin 81 mg EC tablet Take 1 tablet (81 mg) by mouth once daily.      atorvastatin (Lipitor) 40 mg tablet Take 1 tablet (40 mg) by mouth once daily. 90 tablet 0    busPIRone (Buspar) 5 mg tablet Take 2 tablets (10 mg) by mouth 2 times a day. 120 tablet 5    fremanezumab (Ajovy Autoinjector) 225 mg/1.5 mL auto-injector Inject 1 Pen (225 mg) under the skin every 30 (thirty) days.      gabapentin (Neurontin) 300 mg capsule Take 1 capsule (300 mg) by mouth 2 times a day. 60 capsule 0    sertraline (Zoloft) 100 mg tablet Take 1.5 tablets (150 mg) by mouth once daily. 135 tablet 3    SUMAtriptan (Imitrex) 100 mg tablet Take 1 tablet (100 mg) by mouth.      traMADol (Ultram) 50 mg tablet Take 1 tablet (50 mg) by mouth every 6 hours if needed for severe pain (7 - 10). 120 tablet 2    valsartan (Diovan) 80 mg tablet Take 1 tablet (80 mg) by mouth once daily. 90 tablet 1     Current Facility-Administered Medications   Medication  Dose Route Frequency Provider Last Rate Last Admin    lidocaine PF (Xylocaine) 10 mg/mL (1 %) injection 20 mg  2 mL infiltration Once Gil Schmitz PA-C        triamcinolone acetonide (Kenalog-40) injection 80 mg  80 mg intramuscular Once Gil Schmitz PA-C         ALLERGIES:   Allergies   Allergen Reactions    Duloxetine Other and Palpitations     nausea    Oxycodone-Acetaminophen Other     extreme fatigue     MEDICAL HISTORY:   Past Medical History:   Diagnosis Date    Anemia 1993    Anxiety 2008    Arthritis 2011 (in back)    Cataract 2019    Chronic pain     [place]    COPD (chronic obstructive pulmonary disease) (Multi)     Depression 2023    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    Dyslipidemia     10 yr risk 9.7% 2023    Endometriosis 1992    Fractures 2022    History of COVID-19 12/2020    Hypertension     Joint pain 2011    Low back pain 2009    Lumbosacral disc disease 2012    Migraines     [facility]    Neck pain 2022    Osteoarthritis 2012    Peptic ulceration 2000    Peripheral neuropathy 2015    SVT (supraventricular tachycardia) (CMS-HCC)     Urinary tract infection 26094     FAMILY HISTORY:   Family History   Problem Relation Name Age of Onset    Stroke Mother Luave     Hypertension Mother Luave     Diabetes Mother Luave     Migraines Mother Luave     Depression Mother Luave     Miscarriages / Stillbirths Mother Luave     Osteoporosis Mother Luave     Blood clot Father Poppy     Other (black lung) Father Poppy     Arthritis Father Poppy     Cancer Father Poppy     COPD Father Poppy     Alcohol abuse Father Poppy     Heart disease Father Poppy     Atrial fibrillation Sister Berd     Breast cancer Sister Berd     Cancer Sister Berd     Breast cancer Sister Berdie     Alzheimer's disease Sister Berdie     Atrial fibrillation Sister Berdie     Cancer Sister Berdie     Cancer Sister Mar     Depression Sister Mar     Developmental delay Sister Mar     Breast cancer Sister Mar      Learning disabilities Sister Mar     Mental illness Sister Mar     Cancer Sister Peg     Depression Sister Peg     Alcohol abuse Sister Peg     Alcohol abuse Sister Fernanda     Cancer Sister Fernanda     Depression Sister Fernanda     Ovarian cancer Sister Fernanda     Other (pacemaker) Brother anusha         passed at 69 from ALS    Atrial fibrillation Brother Errol     Diabetes Brother Errol     Heart disease Brother Errol     Coronary artery disease Brother Reginaldo     Diabetes Brother Reginaldo     Hypertension Brother Reginaldo     Other (htn induced brain bleed) Son      Alzheimer's disease Father's Sister Joe     Dementia Other sister     Heart attack Other sister     Diabetes Other sister      SOCIAL HISTORY:   Social History     Tobacco Use    Smoking status: Former     Current packs/day: 0.00     Average packs/day: 4.0 packs/day for 53.9 years (215.7 ttl pk-yrs)     Types: Cigarettes, Cigars     Start date: 1967     Quit date: 2014     Years since quittin.8    Smokeless tobacco: Never    Tobacco comments:     3 to 4 pks a day   Vaping Use    Vaping status: Never Used   Substance Use Topics    Alcohol use: Not Currently     Comment: I don't like drinking    Drug use: Never         ROS:      CONSTITUTION:  alert and oriented     OPHTHALMOLOGY:          no Change in vision.         CARDIOLOGY:          no Chest pain.  no Dyspnea on exertion.  no Shortness of breath.         ENDOCRINOLOGY:          no Excessive thirst.  no Excessive urination.  no Fatigue.  no Skin changes.  no Weight gain.  no Weight loss.         SKIN:          no Healing problems.         NEUROLOGY:          no Loss of sensation in specific body area.  no Burning pain in feet.  no Peripheral neuropathy.  no Tingling/numbness.    LABS  Lab Results   Component Value Date    GLUCOSE 100 (H) 2024    CALCIUM 9.6 2024     2024    K 4.6 2024    CO2 26 2024     2024    BUN 12 2024    CREATININE 0.69  "09/13/2024     Lab Results   Component Value Date    CHOL 212 (H) 09/13/2024    CHOL 178 10/30/2023    CHOL 258 (H) 11/19/2021     Lab Results   Component Value Date    HDL 93.2 09/13/2024    HDL 87.4 10/30/2023    HDL 93 11/19/2021     Lab Results   Component Value Date    LDLCALC 101 (H) 09/13/2024    LDLCALC 70 10/30/2023    LDLCALC 153 (H) 11/19/2021     Lab Results   Component Value Date    TRIG 91 09/13/2024    TRIG 102 10/30/2023    TRIG 58 11/19/2021     No components found for: \"CHOLHDL\"   Latest Reference Range & Units 09/13/24 08:34   Albumin, Urine Random Not established mg/L 15.1   Creatinine, Urine Random 20.0 - 320.0 mg/dL 138.2   Albumin/Creatinine Ratio <30.0 ug/mg Creat 10.9          VITAL SIGNS:  /84   Pulse 67   Wt 94.7 kg (208 lb 12.8 oz)   SpO2 97%   BMI 38.19 kg/m²     General Appearance: awake, alert, oriented, in no acute distress  Lungs: Normal expansion.  Clear to auscultation.  No rales, rhonchi, or wheezing.  Heart: Heart sounds are normal.  Regular rate and rhythm without murmur, gallop or rub.  Extremities: Extremities warm to touch, pink, with no edema.  Neurologic: Alert and oriented x 3, gait normal., reflexes normal and symmetric, strength and  sensation grossly normal    Kristy was seen today for hypertension.  Diagnoses and all orders for this visit:  Primary hypertension (Primary)  -     Follow Up In Primary Care - Established  -     valsartan (Diovan) 80 mg tablet; Take 1 tablet (80 mg) by mouth once daily.  -     Follow Up In Primary Care - Established; Future  Mixed hyperlipidemia  Comments:  CONT ATORVASTATIN              "

## 2024-09-09 ASSESSMENT — PROMIS GLOBAL HEALTH SCALE
RATE_SOCIAL_SATISFACTION: FAIR
RATE_QUALITY_OF_LIFE: FAIR
RATE_PHYSICAL_HEALTH: POOR
RATE_AVERAGE_PAIN: 9
EMOTIONAL_PROBLEMS: SOMETIMES
CARRYOUT_PHYSICAL_ACTIVITIES: A LITTLE
RATE_MENTAL_HEALTH: FAIR
RATE_AVERAGE_FATIGUE: MODERATE
RATE_GENERAL_HEALTH: POOR
CARRYOUT_SOCIAL_ACTIVITIES: FAIR

## 2024-09-12 RX ORDER — TOPIRAMATE 50 MG/1
TABLET, FILM COATED ORAL
COMMUNITY
End: 2024-09-13 | Stop reason: WASHOUT

## 2024-09-12 RX ORDER — ACETAMINOPHEN, DEXTROMETHORPHAN HBR, DOXYLAMINE SUCCINATE, PHENYLEPHRINE HCL 650; 20; 12.5; 1 MG/30ML; MG/30ML; MG/30ML; MG/30ML
SOLUTION ORAL EVERY 24 HOURS
COMMUNITY
End: 2024-09-13 | Stop reason: WASHOUT

## 2024-09-13 ENCOUNTER — OFFICE VISIT (OUTPATIENT)
Dept: PRIMARY CARE | Facility: CLINIC | Age: 69
End: 2024-09-13
Payer: COMMERCIAL

## 2024-09-13 ENCOUNTER — LAB (OUTPATIENT)
Dept: LAB | Facility: LAB | Age: 69
End: 2024-09-13
Payer: COMMERCIAL

## 2024-09-13 VITALS
HEIGHT: 62 IN | BODY MASS INDEX: 37.32 KG/M2 | WEIGHT: 202.8 LBS | SYSTOLIC BLOOD PRESSURE: 126 MMHG | DIASTOLIC BLOOD PRESSURE: 68 MMHG | OXYGEN SATURATION: 99 % | HEART RATE: 80 BPM

## 2024-09-13 DIAGNOSIS — I10 ESSENTIAL (PRIMARY) HYPERTENSION: ICD-10-CM

## 2024-09-13 DIAGNOSIS — Z00.00 WELL ADULT EXAM: ICD-10-CM

## 2024-09-13 DIAGNOSIS — I47.10 PAROXYSMAL SUPRAVENTRICULAR TACHYCARDIA (CMS-HCC): ICD-10-CM

## 2024-09-13 DIAGNOSIS — J44.9 CHRONIC OBSTRUCTIVE PULMONARY DISEASE, UNSPECIFIED COPD TYPE (MULTI): ICD-10-CM

## 2024-09-13 DIAGNOSIS — F41.8 DEPRESSION WITH ANXIETY: ICD-10-CM

## 2024-09-13 DIAGNOSIS — Z00.00 WELL ADULT EXAM: Primary | ICD-10-CM

## 2024-09-13 DIAGNOSIS — R42 DIZZINESS: ICD-10-CM

## 2024-09-13 PROBLEM — R19.5 POSITIVE COLORECTAL CANCER SCREENING USING COLOGUARD TEST: Status: RESOLVED | Noted: 2023-09-20 | Resolved: 2024-09-13

## 2024-09-13 PROBLEM — R06.02 SHORTNESS OF BREATH: Status: RESOLVED | Noted: 2022-02-18 | Resolved: 2024-09-13

## 2024-09-13 LAB
ANION GAP SERPL CALC-SCNC: 14 MMOL/L (ref 10–20)
APPEARANCE UR: CLEAR
BILIRUB UR STRIP.AUTO-MCNC: NEGATIVE MG/DL
BUN SERPL-MCNC: 12 MG/DL (ref 6–23)
CALCIUM SERPL-MCNC: 9.6 MG/DL (ref 8.6–10.3)
CHLORIDE SERPL-SCNC: 104 MMOL/L (ref 98–107)
CHOLEST SERPL-MCNC: 212 MG/DL (ref 0–199)
CHOLESTEROL/HDL RATIO: 2.3
CO2 SERPL-SCNC: 26 MMOL/L (ref 21–32)
COLOR UR: YELLOW
CREAT SERPL-MCNC: 0.69 MG/DL (ref 0.5–1.05)
CREAT UR-MCNC: 138.2 MG/DL (ref 20–320)
EGFRCR SERPLBLD CKD-EPI 2021: >90 ML/MIN/1.73M*2
GLUCOSE SERPL-MCNC: 100 MG/DL (ref 74–99)
GLUCOSE UR STRIP.AUTO-MCNC: NORMAL MG/DL
HDLC SERPL-MCNC: 93.2 MG/DL
KETONES UR STRIP.AUTO-MCNC: NEGATIVE MG/DL
LDLC SERPL CALC-MCNC: 101 MG/DL
LEUKOCYTE ESTERASE UR QL STRIP.AUTO: NEGATIVE
MICROALBUMIN UR-MCNC: 15.1 MG/L
MICROALBUMIN/CREAT UR: 10.9 UG/MG CREAT
NITRITE UR QL STRIP.AUTO: NEGATIVE
NON HDL CHOLESTEROL: 119 MG/DL (ref 0–149)
PH UR STRIP.AUTO: 6 [PH]
POTASSIUM SERPL-SCNC: 4.6 MMOL/L (ref 3.5–5.3)
PROT UR STRIP.AUTO-MCNC: NEGATIVE MG/DL
RBC # UR STRIP.AUTO: NEGATIVE /UL
SODIUM SERPL-SCNC: 139 MMOL/L (ref 136–145)
SP GR UR STRIP.AUTO: 1.02
TRIGL SERPL-MCNC: 91 MG/DL (ref 0–149)
UROBILINOGEN UR STRIP.AUTO-MCNC: NORMAL MG/DL
VLDL: 18 MG/DL (ref 0–40)

## 2024-09-13 PROCEDURE — 36415 COLL VENOUS BLD VENIPUNCTURE: CPT

## 2024-09-13 PROCEDURE — 1160F RVW MEDS BY RX/DR IN RCRD: CPT | Performed by: FAMILY MEDICINE

## 2024-09-13 PROCEDURE — 3078F DIAST BP <80 MM HG: CPT | Performed by: FAMILY MEDICINE

## 2024-09-13 PROCEDURE — 3008F BODY MASS INDEX DOCD: CPT | Performed by: FAMILY MEDICINE

## 2024-09-13 PROCEDURE — 82043 UR ALBUMIN QUANTITATIVE: CPT

## 2024-09-13 PROCEDURE — 3074F SYST BP LT 130 MM HG: CPT | Performed by: FAMILY MEDICINE

## 2024-09-13 PROCEDURE — 1159F MED LIST DOCD IN RCRD: CPT | Performed by: FAMILY MEDICINE

## 2024-09-13 PROCEDURE — 99397 PER PM REEVAL EST PAT 65+ YR: CPT | Performed by: FAMILY MEDICINE

## 2024-09-13 PROCEDURE — 1125F AMNT PAIN NOTED PAIN PRSNT: CPT | Performed by: FAMILY MEDICINE

## 2024-09-13 PROCEDURE — 1036F TOBACCO NON-USER: CPT | Performed by: FAMILY MEDICINE

## 2024-09-13 PROCEDURE — 82570 ASSAY OF URINE CREATININE: CPT

## 2024-09-13 RX ORDER — SERTRALINE HYDROCHLORIDE 100 MG/1
150 TABLET, FILM COATED ORAL DAILY
Qty: 135 TABLET | Refills: 3 | Status: SHIPPED | OUTPATIENT
Start: 2024-09-13 | End: 2025-09-08

## 2024-09-13 ASSESSMENT — PAIN SCALES - GENERAL: PAINLEVEL: 6

## 2024-09-13 ASSESSMENT — PATIENT HEALTH QUESTIONNAIRE - PHQ9
1. LITTLE INTEREST OR PLEASURE IN DOING THINGS: NOT AT ALL
SUM OF ALL RESPONSES TO PHQ9 QUESTIONS 1 AND 2: 0
2. FEELING DOWN, DEPRESSED OR HOPELESS: NOT AT ALL

## 2024-09-14 ENCOUNTER — PATIENT MESSAGE (OUTPATIENT)
Dept: PAIN MEDICINE | Facility: CLINIC | Age: 69
End: 2024-09-14
Payer: COMMERCIAL

## 2024-09-14 DIAGNOSIS — M54.16 LUMBAR RADICULOPATHY: ICD-10-CM

## 2024-09-16 RX ORDER — GABAPENTIN 300 MG/1
300 CAPSULE ORAL 2 TIMES DAILY
Qty: 60 CAPSULE | Refills: 0 | Status: SHIPPED | OUTPATIENT
Start: 2024-09-16

## 2024-10-04 ENCOUNTER — APPOINTMENT (OUTPATIENT)
Dept: PRIMARY CARE | Facility: CLINIC | Age: 69
End: 2024-10-04
Payer: COMMERCIAL

## 2024-10-04 DIAGNOSIS — E78.2 MIXED HYPERLIPIDEMIA: ICD-10-CM

## 2024-10-04 RX ORDER — ATORVASTATIN CALCIUM 40 MG/1
40 TABLET, FILM COATED ORAL DAILY
Qty: 90 TABLET | Refills: 0 | Status: SHIPPED | OUTPATIENT
Start: 2024-10-04

## 2024-10-04 NOTE — TELEPHONE ENCOUNTER
Lov 9/13/24 NEXT VISIT IS 10/11/24.  Pt seems anxious about being out of this medication.  Can you send this in for Dr. Ham?  Last filled 10/04/23.  Pt is UTD on labs

## 2024-10-08 ENCOUNTER — APPOINTMENT (OUTPATIENT)
Dept: PRIMARY CARE | Facility: CLINIC | Age: 69
End: 2024-10-08
Payer: COMMERCIAL

## 2024-10-11 ENCOUNTER — OFFICE VISIT (OUTPATIENT)
Dept: PRIMARY CARE | Facility: CLINIC | Age: 69
End: 2024-10-11
Payer: COMMERCIAL

## 2024-10-11 ENCOUNTER — PATIENT MESSAGE (OUTPATIENT)
Dept: PRIMARY CARE | Facility: CLINIC | Age: 69
End: 2024-10-11

## 2024-10-11 ENCOUNTER — TELEPHONE (OUTPATIENT)
Dept: PRIMARY CARE | Facility: CLINIC | Age: 69
End: 2024-10-11

## 2024-10-11 VITALS
OXYGEN SATURATION: 97 % | BODY MASS INDEX: 38.19 KG/M2 | HEART RATE: 67 BPM | WEIGHT: 208.8 LBS | SYSTOLIC BLOOD PRESSURE: 136 MMHG | DIASTOLIC BLOOD PRESSURE: 84 MMHG

## 2024-10-11 DIAGNOSIS — I10 PRIMARY HYPERTENSION: Primary | ICD-10-CM

## 2024-10-11 DIAGNOSIS — E66.09 CLASS 2 OBESITY DUE TO EXCESS CALORIES WITHOUT SERIOUS COMORBIDITY WITH BODY MASS INDEX (BMI) OF 38.0 TO 38.9 IN ADULT: Primary | ICD-10-CM

## 2024-10-11 DIAGNOSIS — E78.2 MIXED HYPERLIPIDEMIA: ICD-10-CM

## 2024-10-11 DIAGNOSIS — E66.812 CLASS 2 OBESITY DUE TO EXCESS CALORIES WITHOUT SERIOUS COMORBIDITY WITH BODY MASS INDEX (BMI) OF 38.0 TO 38.9 IN ADULT: Primary | ICD-10-CM

## 2024-10-11 PROBLEM — R94.31 ABNORMAL ELECTROCARDIOGRAPHY: Status: RESOLVED | Noted: 2022-11-25 | Resolved: 2024-10-11

## 2024-10-11 PROCEDURE — 1160F RVW MEDS BY RX/DR IN RCRD: CPT | Performed by: FAMILY MEDICINE

## 2024-10-11 PROCEDURE — 3075F SYST BP GE 130 - 139MM HG: CPT | Performed by: FAMILY MEDICINE

## 2024-10-11 PROCEDURE — 99214 OFFICE O/P EST MOD 30 MIN: CPT | Performed by: FAMILY MEDICINE

## 2024-10-11 PROCEDURE — 1125F AMNT PAIN NOTED PAIN PRSNT: CPT | Performed by: FAMILY MEDICINE

## 2024-10-11 PROCEDURE — 1124F ACP DISCUSS-NO DSCNMKR DOCD: CPT | Performed by: FAMILY MEDICINE

## 2024-10-11 PROCEDURE — 1036F TOBACCO NON-USER: CPT | Performed by: FAMILY MEDICINE

## 2024-10-11 PROCEDURE — 3079F DIAST BP 80-89 MM HG: CPT | Performed by: FAMILY MEDICINE

## 2024-10-11 PROCEDURE — 1159F MED LIST DOCD IN RCRD: CPT | Performed by: FAMILY MEDICINE

## 2024-10-11 RX ORDER — VALSARTAN 80 MG/1
80 TABLET ORAL DAILY
Qty: 90 TABLET | Refills: 1 | Status: SHIPPED | OUTPATIENT
Start: 2024-10-11 | End: 2025-04-09

## 2024-10-11 ASSESSMENT — PATIENT HEALTH QUESTIONNAIRE - PHQ9
4. FEELING TIRED OR HAVING LITTLE ENERGY: NEARLY EVERY DAY
1. LITTLE INTEREST OR PLEASURE IN DOING THINGS: MORE THAN HALF THE DAYS
SUM OF ALL RESPONSES TO PHQ9 QUESTIONS 1 AND 2: 4
7. TROUBLE CONCENTRATING ON THINGS, SUCH AS READING THE NEWSPAPER OR WATCHING TELEVISION: SEVERAL DAYS
2. FEELING DOWN, DEPRESSED OR HOPELESS: MORE THAN HALF THE DAYS
5. POOR APPETITE OR OVEREATING: SEVERAL DAYS
6. FEELING BAD ABOUT YOURSELF - OR THAT YOU ARE A FAILURE OR HAVE LET YOURSELF OR YOUR FAMILY DOWN: NEARLY EVERY DAY
8. MOVING OR SPEAKING SO SLOWLY THAT OTHER PEOPLE COULD HAVE NOTICED. OR THE OPPOSITE, BEING SO FIGETY OR RESTLESS THAT YOU HAVE BEEN MOVING AROUND A LOT MORE THAN USUAL: MORE THAN HALF THE DAYS
9. THOUGHTS THAT YOU WOULD BE BETTER OFF DEAD, OR OF HURTING YOURSELF: NOT AT ALL
3. TROUBLE FALLING OR STAYING ASLEEP OR SLEEPING TOO MUCH: NEARLY EVERY DAY
SUM OF ALL RESPONSES TO PHQ QUESTIONS 1-9: 17
10. IF YOU CHECKED OFF ANY PROBLEMS, HOW DIFFICULT HAVE THESE PROBLEMS MADE IT FOR YOU TO DO YOUR WORK, TAKE CARE OF THINGS AT HOME, OR GET ALONG WITH OTHER PEOPLE: VERY DIFFICULT

## 2024-10-11 ASSESSMENT — PAIN SCALES - GENERAL: PAINLEVEL: 8

## 2024-10-11 NOTE — PATIENT INSTRUCTIONS
PAIN MANAGEMENT IN THE ELDERLY  What is pain? Pain is how your body reacts to injury or illness, even as you get older. Pain is not something that normally happens as you age. What you think is painful may not be painful to someone else. Everybody reacts to pain in different ways. But, pain is whatever you say it is! No matter how mild or strong your pain is, you have the right to be “comfortable”. But you need to tell your caregiver that you have pain, so together you can work on treatment options.   The following are some things that are NOT true about getting older and pain.  I should expect to have pain because it is just a part of getting older.  If I tell my caregiver about my pain, he will think I am complaining. You have the right just as anyone else does to be comfortable. Your caregiver cannot treat your pain if you do not tell him about it.  If I tell my caregiver about my pain he will not listen or take me seriously.  If I take pain medicines, I will feel “drugged up” or “doped up”. There are many new medicines now that lessen pain without making you feel “drugged up” when taking them.  Older people should only take pain medicines if their pain is very bad. You have the right to be comfortable no matter if you have a little or a lot of pain.  Pain medicines are addictive and if I take them I will get “hooked”. An addicted person is someone who takes medicines to “get high”. You cannot get addicted to pain medicines if you are taking them to make your pain go away. A person who has diabetes takes their medicine to keep the diabetes I control. It is the same with your pain. You take pain medicines to keep your pain in control and to live a normal life.  What causes pain? Pain can be caused by many things, such as injury, surgery, or disease. Some pain is caused by pressure on a nerve, such as a cancerous tumor. Other pain is caused when nerves are cut as I an accident or surgery. Old injuries that may not  have bothered you may get re?injured, or cause new injuries. You may not want to move the painful part of your body at all. But, you may have pain because you are not moving this body part. But sometimes there is no clear cause for pain.    What are the different types of pain?  Acute pain is short?lived and usually lasts less than 3 months. Caregivers first work to remove the cause of the pain, such fixing a broken arm or leg. Acute pain can usually be controlled or stopped with pain medicine.  Chronic pain lasts longer than 3 to 6 months. Almost 4 out of 5 persons over 65 regularly take medications for chronic pain, and half of this group has seen more than 3 doctors in the past 5 years. This kind of pain is often more complex. Caregivers may use medicines along with other treatments, like physical and relaxation therapies to help your pain.    What is your pain like? Caregivers want you to talk to them about your pain. This helps them learn what may be causing the pain and how to best treat it. You need to tell your caregivers if you have trouble hearing their questions or seeing things. Caregivers can use special tools and ways to better understand their questions about your pain.    What if I cannot talk? Sometimes you may not be able to speak about your pain. You may have illness or injuries like dementia, brain damage, or a stroke. This makes it very hard for your caregivers and family to know you are in pain. Your family may help caregivers understand your pain by watching for physical signs of pain. This means you may act normal or opposite of how your family thinks you should act even though you are having very bad pain.     The following are some signs that your family can watch for that may tell them you are in pain.  If you are normally loud and noisy, you may get very quiet and withdrawn. You may also stop doing activities you used to do.  If you are very quiet and withdrawn, you may get loud, act  stubborn, and hit people.  You may not eat what you normally do. Or, you may only want to drink or eat soft foods.  Suddenly, you may not do all the activities you used to do.  You may lose control of your bowel or bladder.  You may be very depressed and have a sad face.  If you do not talk at all, you may blink your eyes very fast much of the time. You may also grimace.  If you have been very easy going and happy, you may begin to be very sensitive and cry easily.  You might start to walk or move differently than before. You may suddenly stop walking or start pacing all the time.  You may have your knees drawn up to your chest and rock like a baby.  You may touch, rub, pull or pick at a body part that is hurting.  You may start to pull away from peoples’ touch and protect your arms and legs.  You may suddenly begin to fall when you had no problems before.  You may sleep more than usual.  You may start to whimper or groan quietly.  You may become very confused suddenly when there was no problem before.  Why is pain control important? Pain can affect your appetite, how well you sleep, your energy level and ability to do things. Pain can also affect your mood and relationship with others. If caregivers can help you control your pain, you will suffer less and can even heal faster.  Medicines:  Anti?anxiety medicine: this medicine may be given to help you feel less nervous. It may be given by IV, as a shot/injection or by mouth.  Anti?convulsing: this medicine is given to control seizures. It can also be used to treat kinds of chronic nerve pain and may help control your mood swings. It is given by IV, shot/injection or by mouth.  Muscle relaxers: you may need medicine to help your muscles relax. This medicine can be given by IV, shot/injection or by mouth. Muscle relaxers can make you feel dizzy or weak. Call your caregiver if you need help getting out of bed.  NSAIDS: this medicine may be given to decrease  inflammation, which is redness, pain and swelling. It is very good for chronic bone pain that comes from arthritis or cancer. It is given by mouth or inhaled in your nose.  Pain medicine: this medicine affects the nervous system so you feel less pain. Your caregiver will tell you how much to take and how often. Take the medicine regularly as directed by your  caregiver. Do not wait until the pain is too bad. The medicine may not work as well at controlling the pain if you wait too long to take it. Tell caregivers if the pain does not go away or comes back.  Steroids: this medicine may be given to decrease inflammation. There are many different reasons to take steroids. This medication can help a lot but may also have side effects. Be sure  you understand why you need steroids. Don’t stop taking this medicine without your caregiver’s ok. Stopping on your own can cause problems.  Tell your caregiver all medications that you are taking, including over the counter meds and herbals.    How can pain medicine be given? The following are the many different ways pain medicine can be given depending on the kind of pain you are having.  By mouth - you may be given pills or liquid to swallow or you may be given a pill or liquid to put under your tongue. Some medicine can also be given as a lozenge like a cough drop or even as a special lollipop.  Rectal - medicine in a suppository is put into your rectum.  IV - pain medicine can be given as a shot/injection in an IV, into a muscle, or under the skin.  Transdermal - some medicine can be given as a patch that is placed on your skin. This medicine is released slowly to give pain relief for as long as 72 hours.  How can you take pain medicine safely and make it work best for you?  DO NOT wait until you are in pain to take your medicine if your caregiver has suggested a regular schedule around the clock. If you wait until you feel pain, you will only be “chasing” your pain and not  controlling it.  Some pain medicines can make you breathe less deeply and less often. The medicine may also make you sleepy, dizzy, and unsafe to drive a car or use heavy equipment. For these reasons, it is very important to follow your caregiver’s advice on how to use this medicine.  Some foods, alcohol and other medicines may cause unpleasant side effects when you take pain medicine. Follow your caregiver’s advice about how to prevent these problems.  Pain medicine and NSAIDS can make you constipated. Contact your caregiver if you are experiencing constipation.  Do not stop talking pain medicine suddenly if you have been taking it longer than 2 weeks. Your body may have become used to the medicine. Stopping the medicine all at once may cause unpleasant or dangerous side effects.  With time you may feel that the pain medicine is not working as well as it did before. Call your caregiver if this happens. Together you can find new ways to control the pain.    Other non?drug control methods: there are many pain control techniques that can help you deal with pain even if it does not go away completely. It is important to practice some of the techniques whe you do not have pain if possible. This will help the technique work well during an attack of pain.  Breathing exercises.  Environment: being in a quiet place may make it easier for you to deal with the pain. Avoiding bright lights or loud, noisy places can also help control the pain. Making sure your home is not too hot or too cold may also lessen pain.      CHECK WITH YOUR INSURANCE REGARDING WEGOVY OR ZEPBOUND INJECTIONS.  OR CHECK FOR QSYMIA PILLS

## 2024-10-14 ENCOUNTER — TELEPHONE (OUTPATIENT)
Dept: PRIMARY CARE | Facility: CLINIC | Age: 69
End: 2024-10-14
Payer: COMMERCIAL

## 2024-10-14 NOTE — TELEPHONE ENCOUNTER
Dr Ham told her to talk to you about getting weygovy or zepbound and she will need education on how to use it.

## 2024-10-14 NOTE — TELEPHONE ENCOUNTER
Spoke with pt - Wegovy and Zepbound both require PA thru insurance. I started a PA for Zepbound, should get a response in 1-2 days. Will notify patient via [employer] if/when PA is approved, send rx to pharmacy, and schedule training with the patient. Pt reports understanding.

## 2024-10-15 PROBLEM — E66.09 CLASS 2 OBESITY DUE TO EXCESS CALORIES WITHOUT SERIOUS COMORBIDITY IN ADULT: Status: ACTIVE | Noted: 2024-10-15

## 2024-10-15 PROBLEM — E66.812 CLASS 2 OBESITY DUE TO EXCESS CALORIES WITHOUT SERIOUS COMORBIDITY IN ADULT: Status: ACTIVE | Noted: 2024-10-15

## 2024-10-15 RX ORDER — TIRZEPATIDE 2.5 MG/.5ML
2.5 INJECTION, SOLUTION SUBCUTANEOUS
Qty: 2 ML | Refills: 0 | Status: SHIPPED | OUTPATIENT
Start: 2024-10-15

## 2024-10-16 ENCOUNTER — CLINICAL SUPPORT (OUTPATIENT)
Dept: PRIMARY CARE | Facility: CLINIC | Age: 69
End: 2024-10-16
Payer: COMMERCIAL

## 2024-10-16 DIAGNOSIS — E66.812 CLASS 2 OBESITY DUE TO EXCESS CALORIES WITHOUT SERIOUS COMORBIDITY WITH BODY MASS INDEX (BMI) OF 38.0 TO 38.9 IN ADULT: Primary | ICD-10-CM

## 2024-10-16 DIAGNOSIS — E66.09 CLASS 2 OBESITY DUE TO EXCESS CALORIES WITHOUT SERIOUS COMORBIDITY WITH BODY MASS INDEX (BMI) OF 38.0 TO 38.9 IN ADULT: Primary | ICD-10-CM

## 2024-10-16 NOTE — PROGRESS NOTES
Subjective   Patient ID: Kristy Chapin is a 69 y.o. female who presents for Zepbound training .  HPI  Pt referred to start GLP-1 RA for weight loss.   Pt has commercial insurance - PA was completed for Zepbound and was approved 10/14/24 for one year. Pt's cost is $30 a month or $75 for three months.   New rx for Zepbound 2.5mg was sent to pt pharmacy - pt brought rx to office today to take her first shot.     Starting weight: 208 lbs   Starting BMI: 38.19    Current Outpatient Medications:     aspirin 81 mg EC tablet, Take 1 tablet (81 mg) by mouth once daily., Disp: , Rfl:     atorvastatin (Lipitor) 40 mg tablet, Take 1 tablet (40 mg) by mouth once daily., Disp: 90 tablet, Rfl: 0    busPIRone (Buspar) 5 mg tablet, Take 2 tablets (10 mg) by mouth 2 times a day., Disp: 120 tablet, Rfl: 5    fremanezumab (Ajovy Autoinjector) 225 mg/1.5 mL auto-injector, Inject 1 Pen (225 mg) under the skin every 30 (thirty) days., Disp: , Rfl:     gabapentin (Neurontin) 300 mg capsule, Take 1 capsule (300 mg) by mouth 2 times a day., Disp: 60 capsule, Rfl: 0    sertraline (Zoloft) 100 mg tablet, Take 1.5 tablets (150 mg) by mouth once daily., Disp: 135 tablet, Rfl: 3    SUMAtriptan (Imitrex) 100 mg tablet, Take 1 tablet (100 mg) by mouth., Disp: , Rfl:     traMADol (Ultram) 50 mg tablet, Take 1 tablet (50 mg) by mouth every 6 hours if needed for severe pain (7 - 10)., Disp: 120 tablet, Rfl: 2    valsartan (Diovan) 80 mg tablet, Take 1 tablet (80 mg) by mouth once daily., Disp: 90 tablet, Rfl: 1    Zepbound 2.5 mg/0.5 mL injection, Inject 2.5 mg under the skin every 7 days., Disp: 2 mL, Rfl: 0    Current Facility-Administered Medications:     lidocaine PF (Xylocaine) 10 mg/mL (1 %) injection 20 mg, 2 mL, infiltration, Once, Gil J Ainsley, PA-C    triamcinolone acetonide (Kenalog-40) injection 80 mg, 80 mg, intramuscular, Once, Gil Schmitz PA-C   Allergies   Allergen Reactions    Duloxetine Other and Palpitations      nausea    Oxycodone-Acetaminophen Other     extreme fatigue       Review of Systems  See HPI.     Objective   There were no vitals taken for this visit.    Labs:   Lab Results   Component Value Date    HGBA1C 5.5 08/08/2022     Lab Results   Component Value Date    CALCIUM 9.6 09/13/2024    AST 21 02/18/2022    ALKPHOS 74 02/18/2022    BILITOT 0.2 02/18/2022    PROT 7.5 02/18/2022    ALBUMIN 4.6 02/18/2022    GLOB 2.9 02/18/2022    AGR 1.6 02/18/2022     09/13/2024    K 4.6 09/13/2024     09/13/2024    CO2 26 09/13/2024    ANIONGAP 14 09/13/2024    BUN 12 09/13/2024    CREATININE 0.69 09/13/2024    UREACREAUR 24.3 (H) 02/18/2022    GLUCOSE 100 (H) 09/13/2024    ALT 16 02/18/2022    EGFR >90 09/13/2024     Lab Results   Component Value Date    WBC 7.8 02/18/2022    NRBC 0 02/18/2022    RBC 4.45 02/18/2022    HGB 13.2 02/18/2022    HCT 41.0 02/18/2022     02/18/2022     Lab Results   Component Value Date    CHOL 212 (H) 09/13/2024    TRIG 91 09/13/2024    HDL 93.2 09/13/2024    LDLCALC 101 (H) 09/13/2024     Lab Results   Component Value Date    CREATU 138.2 09/13/2024    MICROALBCREA 10.9 09/13/2024     Assessment/Plan   1. Class 2 obesity due to excess calories without serious comorbidity with body mass index (BMI) of 38.0 to 38.9 in adult (Primary)    Zepbound Education Provided:   - Counseled patient on Zepbound MOA, expectations, side effects, duration of therapy, administration, and monitoring parameters.  - Provided detailed dosing and administration counseling to ensure proper technique.   - Reviewed Zepbound titration schedule, starting with 2.5 mg once weekly to a goal of 15 mg once weekly if tolerated. Pt verbalized understanding.  - Counseled patient on the benefits of GLP-1ra glycemic control and weight loss  - Reviewed storage requirements of Zepbound when not in use, and when to administer the medication if a dose is missed.  - Advised patient that they may experience improved satiety  after meals and portion sizes of meals may be reduced as doses of Zepbound increase.  - Counseled patient to avoid foods that are fatty/oily as this may precipitate the nausea/GI upset that may occur with new start Ozempic.   - Answered all patient questions and concerns.    Plan:  Patient took her first dose of Zepbound 2.5mg today in the office.   - She will continue taking 2.5mg once weekly on Wednesdays.   Patient will follow up with Dr. Ham in one month to increase dose to 5mg weekly.   - Pt aware she could continue to increase the dose every 4 weeks; however, if she is doing well at a particular dose and continuing to lose weight, then she should stay at that dose for as long as possible and only increase the dose if her weight loss slows.   - Pt also aware to notify office if she has worsening GI side effects, or side effects that do not improve after a few weeks on the medication.       Follow Up: 1 month Dr. Ham for weight check and dose titration    DARRELL Ang, PharmD, BCACP, CDCES.

## 2024-10-31 ENCOUNTER — OFFICE VISIT (OUTPATIENT)
Dept: PRIMARY CARE | Facility: CLINIC | Age: 69
End: 2024-10-31
Payer: COMMERCIAL

## 2024-10-31 VITALS
HEART RATE: 70 BPM | WEIGHT: 202.4 LBS | BODY MASS INDEX: 37.25 KG/M2 | HEIGHT: 62 IN | SYSTOLIC BLOOD PRESSURE: 146 MMHG | DIASTOLIC BLOOD PRESSURE: 82 MMHG

## 2024-10-31 DIAGNOSIS — E66.812 CLASS 2 OBESITY DUE TO EXCESS CALORIES WITHOUT SERIOUS COMORBIDITY WITH BODY MASS INDEX (BMI) OF 37.0 TO 37.9 IN ADULT: ICD-10-CM

## 2024-10-31 DIAGNOSIS — E66.09 CLASS 2 OBESITY DUE TO EXCESS CALORIES WITHOUT SERIOUS COMORBIDITY WITH BODY MASS INDEX (BMI) OF 37.0 TO 37.9 IN ADULT: ICD-10-CM

## 2024-10-31 DIAGNOSIS — R63.5 WEIGHT GAIN: Primary | ICD-10-CM

## 2024-10-31 PROCEDURE — 1125F AMNT PAIN NOTED PAIN PRSNT: CPT | Performed by: FAMILY MEDICINE

## 2024-10-31 PROCEDURE — 1036F TOBACCO NON-USER: CPT | Performed by: FAMILY MEDICINE

## 2024-10-31 PROCEDURE — 3077F SYST BP >= 140 MM HG: CPT | Performed by: FAMILY MEDICINE

## 2024-10-31 PROCEDURE — 3079F DIAST BP 80-89 MM HG: CPT | Performed by: FAMILY MEDICINE

## 2024-10-31 PROCEDURE — 1159F MED LIST DOCD IN RCRD: CPT | Performed by: FAMILY MEDICINE

## 2024-10-31 PROCEDURE — 99213 OFFICE O/P EST LOW 20 MIN: CPT | Performed by: FAMILY MEDICINE

## 2024-10-31 PROCEDURE — 3008F BODY MASS INDEX DOCD: CPT | Performed by: FAMILY MEDICINE

## 2024-10-31 ASSESSMENT — PAIN SCALES - GENERAL: PAINLEVEL_OUTOF10: 6

## 2024-11-01 NOTE — PROGRESS NOTES
Subjective   Patient ID: Kristy Chapin is a 69 y.o. female who presents for No chief complaint on file..    HPI   Pt here for 1 month f/u after starting zepbound on 10/16/24.    Starting wt 208 lbs BMI 38.19.  She needs to lose 10 lbs at least /5% to cont on meds    Today her wt is: 202 lbs BMI 27.  She has lost 6 of the 10 lbs needed.  Does get nausea and some constipation but it resolves.  Cravings are down kye with sweets.     Month #2: wt:         BMI:      Past Medical History:   Diagnosis Date    Allergic ??? 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      atorvastatin (Lipitor) 40 mg tablet Take 1 tablet (40 mg) by mouth once daily. 90 tablet 0    busPIRone (Buspar) 5 mg tablet Take 2 tablets (10 mg) by mouth 2 times a day. 120 tablet 5    fremanezumab (Ajovy Autoinjector) 225 mg/1.5 mL auto-injector Inject 1 Pen (225 mg) under the skin every 30 (thirty) days.      gabapentin (Neurontin) 300 mg capsule Take 1 capsule (300 mg) by mouth 2 times a day. 60 capsule 0    sertraline (Zoloft) 100 mg tablet Take 1.5 tablets (150 mg) by mouth once daily. 135 tablet 3    SUMAtriptan (Imitrex) 100 mg tablet Take 1 tablet (100 mg) by mouth.      tirzepatide, weight loss, (Zepbound) 5  mg/0.5 mL injection Inject 5 mg under the skin every 7 days for 28 days. 2 mL 0    traMADol (Ultram) 50 mg tablet Take 1 tablet (50 mg) by mouth every 6 hours if needed for severe pain (7 - 10). 120 tablet 2    valsartan (Diovan) 80 mg tablet Take 1 tablet (80 mg) by mouth once daily. 90 tablet 1     Current Facility-Administered Medications   Medication Dose Route Frequency Provider Last Rate Last Admin    lidocaine PF (Xylocaine) 10 mg/mL (1 %) injection 20 mg  2 mL infiltration Once Gil Schmitz PA-C        triamcinolone acetonide (Kenalog-40) injection 80 mg  80 mg intramuscular Once Gil Schmitz PA-C           Review of Systems see hpi    Objective   There were no vitals taken for this visit.    Physical Exam  Vitals reviewed.   Constitutional:       Appearance: Normal appearance.   HENT:      Head: Normocephalic and atraumatic.   Cardiovascular:      Rate and Rhythm: Normal rate and regular rhythm.      Heart sounds: Normal heart sounds.   Pulmonary:      Effort: Pulmonary effort is normal.      Breath sounds: Normal breath sounds.   Abdominal:      General: Abdomen is flat. There is no distension.      Palpations: Abdomen is soft.      Tenderness: There is no abdominal tenderness.   Neurological:      Mental Status: She is alert.         Assessment/Plan   Assessment & Plan  Weight gain         Class 2 obesity due to excess calories without serious comorbidity with body mass index (BMI) of 37.0 to 37.9 in adult

## 2024-11-08 ENCOUNTER — OFFICE VISIT (OUTPATIENT)
Dept: PAIN MEDICINE | Facility: CLINIC | Age: 69
End: 2024-11-08
Payer: COMMERCIAL

## 2024-11-08 ENCOUNTER — APPOINTMENT (OUTPATIENT)
Dept: PAIN MEDICINE | Facility: CLINIC | Age: 69
End: 2024-11-08
Payer: COMMERCIAL

## 2024-11-08 VITALS
DIASTOLIC BLOOD PRESSURE: 80 MMHG | WEIGHT: 202 LBS | BODY MASS INDEX: 37.17 KG/M2 | OXYGEN SATURATION: 100 % | RESPIRATION RATE: 18 BRPM | HEIGHT: 62 IN | HEART RATE: 70 BPM | SYSTOLIC BLOOD PRESSURE: 130 MMHG

## 2024-11-08 DIAGNOSIS — M54.16 LUMBAR RADICULOPATHY: ICD-10-CM

## 2024-11-08 DIAGNOSIS — G43.009 MIGRAINE WITHOUT AURA AND WITHOUT STATUS MIGRAINOSUS, NOT INTRACTABLE: ICD-10-CM

## 2024-11-08 PROCEDURE — 3075F SYST BP GE 130 - 139MM HG: CPT | Performed by: PHYSICIAN ASSISTANT

## 2024-11-08 PROCEDURE — 99214 OFFICE O/P EST MOD 30 MIN: CPT | Performed by: PHYSICIAN ASSISTANT

## 2024-11-08 PROCEDURE — 3079F DIAST BP 80-89 MM HG: CPT | Performed by: PHYSICIAN ASSISTANT

## 2024-11-08 PROCEDURE — 1159F MED LIST DOCD IN RCRD: CPT | Performed by: PHYSICIAN ASSISTANT

## 2024-11-08 PROCEDURE — 1036F TOBACCO NON-USER: CPT | Performed by: PHYSICIAN ASSISTANT

## 2024-11-08 PROCEDURE — 1160F RVW MEDS BY RX/DR IN RCRD: CPT | Performed by: PHYSICIAN ASSISTANT

## 2024-11-08 PROCEDURE — 3008F BODY MASS INDEX DOCD: CPT | Performed by: PHYSICIAN ASSISTANT

## 2024-11-08 PROCEDURE — 1125F AMNT PAIN NOTED PAIN PRSNT: CPT | Performed by: PHYSICIAN ASSISTANT

## 2024-11-08 RX ORDER — UBIDECARENONE 30 MG
30 CAPSULE ORAL DAILY
COMMUNITY

## 2024-11-08 RX ORDER — GABAPENTIN 300 MG/1
300 CAPSULE ORAL 2 TIMES DAILY
Qty: 60 CAPSULE | Refills: 2 | Status: SHIPPED | OUTPATIENT
Start: 2024-11-08 | End: 2025-02-06

## 2024-11-08 RX ORDER — TRAMADOL HYDROCHLORIDE 50 MG/1
50 TABLET ORAL EVERY 6 HOURS PRN
Qty: 120 TABLET | Refills: 2 | Status: SHIPPED | OUTPATIENT
Start: 2024-11-13 | End: 2025-02-11

## 2024-11-08 ASSESSMENT — ENCOUNTER SYMPTOMS
FATIGUE: 0
ARTHRALGIAS: 1
FEVER: 0
COUGH: 0
ACTIVITY CHANGE: 0
VOICE CHANGE: 0
VOMITING: 0
PALPITATIONS: 0
NAUSEA: 0
SORE THROAT: 0
DIARRHEA: 0
MYALGIAS: 1
CHEST TIGHTNESS: 0
SLEEP DISTURBANCE: 0
SHORTNESS OF BREATH: 0
UNEXPECTED WEIGHT CHANGE: 0
BACK PAIN: 1
CHILLS: 0
PAIN: 1

## 2024-11-08 ASSESSMENT — LIFESTYLE VARIABLES
HOW OFTEN DO YOU HAVE SIX OR MORE DRINKS ON ONE OCCASION: NEVER
HOW MANY STANDARD DRINKS CONTAINING ALCOHOL DO YOU HAVE ON A TYPICAL DAY: PATIENT DOES NOT DRINK
HOW OFTEN DO YOU HAVE A DRINK CONTAINING ALCOHOL: NEVER
SKIP TO QUESTIONS 9-10: 1
AUDIT-C TOTAL SCORE: 0

## 2024-11-08 ASSESSMENT — PAIN - FUNCTIONAL ASSESSMENT: PAIN_FUNCTIONAL_ASSESSMENT: 0-10

## 2024-11-08 ASSESSMENT — PAIN SCALES - GENERAL
PAINLEVEL_OUTOF10: 7
PAINLEVEL_OUTOF10: 7

## 2024-11-08 NOTE — PROGRESS NOTES
MEDICATION NAME: Tramadol  STRENGTH: 50mg  LAST FILL DATE: 10/14/24  DATE LAST TAKEN: 24  QUANTITY FILLED: 120  QUANTITY REMAININ  COUNT COMPLETED BY: DYLAN VILLALBA and SASKIA STEEL      UDS LAST COMPLETED:   CONTROLLED SUBSTANCES AGREEMENT LAST SIGNED:   ORT LAST COMPLETED:  Modified Oswestry disability form filled out annually.

## 2024-11-08 NOTE — PROGRESS NOTES
Subjective   Patient ID: Kristy Chapin is a 69 y.o. female who presents for Pain.  Patient is a 69 old female with lumbar radiculopathy history of migraines and myofascial pain presents today for follow-up.  Patient denies that the myofascial injections have drastically reduce her symptoms.  She is stating she is still about 70% better in this region denies any injuries traumas or falls her migraines are relatively well-controlled without any major variation.  She still is having some of the memory issues and is supposed to be following up with neurology    Pain  Pertinent negatives include no chest pain, diarrhea, fatigue, fever, nausea, shortness of breath or vomiting.       Review of Systems   Constitutional:  Negative for activity change, chills, fatigue, fever and unexpected weight change.   HENT:  Negative for ear pain, sore throat and voice change.    Eyes:  Negative for visual disturbance.   Respiratory:  Negative for cough, chest tightness and shortness of breath.    Cardiovascular:  Negative for chest pain and palpitations.   Gastrointestinal:  Negative for diarrhea, nausea and vomiting.   Musculoskeletal:  Positive for arthralgias, back pain and myalgias.   Psychiatric/Behavioral:  Negative for behavioral problems, self-injury, sleep disturbance and suicidal ideas.        Objective   Physical Exam  Vitals reviewed.   Constitutional:       Appearance: Normal appearance.   HENT:      Head: Normocephalic and atraumatic.      Mouth/Throat:      Mouth: Mucous membranes are moist.   Neck:      Vascular: No JVD.   Pulmonary:      Effort: Pulmonary effort is normal. No tachypnea or bradypnea.   Abdominal:      Palpations: Abdomen is soft.   Musculoskeletal:      Right elbow: No effusion or lacerations. Normal range of motion. Tenderness present in medial epicondyle.      Left elbow: No deformity, effusion or lacerations. Normal range of motion. Tenderness present in medial epicondyle.      Lumbar back: Spasms  and tenderness present. Decreased range of motion. Positive right straight leg raise test. Negative left straight leg raise test.   Skin:     General: Skin is warm and dry.   Neurological:      Mental Status: She is alert and oriented to person, place, and time.   Psychiatric:         Mood and Affect: Mood normal.         Behavior: Behavior normal. Behavior is cooperative.       Assessment/Plan   Problem List Items Addressed This Visit             ICD-10-CM    Lumbar radiculopathy M54.16    Relevant Medications    traMADol (Ultram) 50 mg tablet (Start on 11/13/2024)    gabapentin (Neurontin) 300 mg capsule    Migraine without aura and without status migrainosus, not intractable G43.009    Relevant Medications    traMADol (Ultram) 50 mg tablet (Start on 11/13/2024)     I had nice discussion with the patient today our plan will be as follows.      Radiology: [ none at this time ]      Physically:  [ continue modification of activities, healthy lifestyle choice ]      Psychologically:  [ No acute psychological concerns. There are no mental health issues of which I am aware that are contributing to the patient's pain. There are no substance abuse or alcohol abuse issues of which I am aware that are contributing to the patient's pain.  ]      Medication: [ I will refill the patient's opioids today for 3 month.  The patient continues to see benefit and improvement in their quality of life and ability to maintain ADLs.  Patient educated about the risks of taking opioids and operating a motor vehicle.  Patient reports no adverse side effects to current medication regimen.  Current regimen does allow patient to maintain ADLs.  Patient reports no new neurologic symptoms, new pain areas, or exacerbation in pain today.  Patient reports they are happy with current treatment care path.    [place] was reviewed and was consistent with the history.    Patient has been educated on the risks, benefits, and alternatives of controlled  substances as well as the proper way to store these medications.  The patient and I discussed the nature of this medication and its side effects.  We discussed tolerance, physical dependence, psychological dependence, addiction and opioid-induced hyperalgesia.  We discussed the potential need to wean from this medication.  We discussed the availability of programs that can help with this process if necessary.  We discussed safety issues related to opioids including safe storage.  We discussed the fact that the patient should not drive an automobile or operate heavy machinery while taking this medication.  A prescription for naloxone was offered to the patient.  The patient will be re-evaluated for the need to continue opioid therapy in 90 days. ]      Duration:  [ 3 months ]      Intervention:  [ none at this time. Patient is stable.  ]         Gil Schmitz PA-C 11/08/24 2:39 PM

## 2024-11-20 ENCOUNTER — PHARMACY VISIT (OUTPATIENT)
Dept: PHARMACY | Facility: CLINIC | Age: 69
End: 2024-11-20
Payer: COMMERCIAL

## 2024-11-20 PROCEDURE — RXMED WILLOW AMBULATORY MEDICATION CHARGE

## 2024-11-29 ENCOUNTER — APPOINTMENT (OUTPATIENT)
Dept: PRIMARY CARE | Facility: CLINIC | Age: 69
End: 2024-11-29
Payer: COMMERCIAL

## 2024-11-29 DIAGNOSIS — R63.5 WEIGHT GAIN: Primary | ICD-10-CM

## 2024-11-29 DIAGNOSIS — E66.812 CLASS 2 OBESITY DUE TO EXCESS CALORIES WITHOUT SERIOUS COMORBIDITY WITH BODY MASS INDEX (BMI) OF 37.0 TO 37.9 IN ADULT: ICD-10-CM

## 2024-11-29 DIAGNOSIS — E66.09 CLASS 2 OBESITY DUE TO EXCESS CALORIES WITHOUT SERIOUS COMORBIDITY WITH BODY MASS INDEX (BMI) OF 37.0 TO 37.9 IN ADULT: ICD-10-CM

## 2024-11-30 NOTE — PROGRESS NOTES
Subjective   Patient ID: Kristy Chapin is a 69 y.o. female who presents for No chief complaint on file..    HPI   Pt here for 1 month f/u after starting zepbound on 10/16/24.    Starting wt 208 lbs BMI 38.19.  She needs to lose 10 lbs at least /5% to cont on meds    Today her wt is: 202 lbs BMI 27.  She has lost 6 of the 10 lbs needed.  Does get nausea and some constipation but it resolves.  Cravings are down kye with sweets.     Month #2: wt:         BMI:      Past Medical History:   Diagnosis Date    Allergic ??? 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      atorvastatin (Lipitor) 40 mg tablet Take 1 tablet (40 mg) by mouth once daily. 90 tablet 0    busPIRone (Buspar) 5 mg tablet Take 2 tablets (10 mg) by mouth 2 times a day. 120 tablet 5    co-enzyme Q-10 30 mg capsule Take 1 capsule (30 mg) by mouth once daily.      fremanezumab (Ajovy Autoinjector) 225 mg/1.5 mL auto-injector Inject 1 Pen (225 mg) under the skin every 30 (thirty) days.      gabapentin (Neurontin) 300 mg capsule Take 1 capsule (300 mg) by mouth 2 times a day. 60 capsule 2    sertraline (Zoloft) 100 mg tablet Take 1.5 tablets (150 mg) by mouth once daily. 135 tablet 3    SUMAtriptan (Imitrex) 100 mg tablet  Take 1 tablet (100 mg) by mouth.      tirzepatide, weight loss, (Zepbound) 5 mg/0.5 mL injection Inject 5 mg under the skin every 7 days for 28 days. 2 mL 0    traMADol (Ultram) 50 mg tablet Take 1 tablet (50 mg) by mouth every 6 hours if needed for severe pain (7 - 10). Do not fill before November 13, 2024. 120 tablet 2    valsartan (Diovan) 80 mg tablet Take 1 tablet (80 mg) by mouth once daily. 90 tablet 1     Current Facility-Administered Medications   Medication Dose Route Frequency Provider Last Rate Last Admin    lidocaine PF (Xylocaine) 10 mg/mL (1 %) injection 20 mg  2 mL infiltration Once Gil Schmitz PA-C        triamcinolone acetonide (Kenalog-40) injection 80 mg  80 mg intramuscular Once Gil Schmitz PA-C           Review of Systems see hpi    Objective   There were no vitals taken for this visit.    Physical Exam  Vitals reviewed.   Constitutional:       Appearance: Normal appearance.   HENT:      Head: Normocephalic and atraumatic.   Cardiovascular:      Rate and Rhythm: Normal rate and regular rhythm.      Heart sounds: Normal heart sounds.   Pulmonary:      Effort: Pulmonary effort is normal.      Breath sounds: Normal breath sounds.   Abdominal:      General: Abdomen is flat. There is no distension.      Palpations: Abdomen is soft.      Tenderness: There is no abdominal tenderness.   Neurological:      Mental Status: She is alert.         Assessment/Plan   Assessment & Plan  Weight gain         Class 2 obesity due to excess calories without serious comorbidity with body mass index (BMI) of 37.0 to 37.9 in adult

## 2024-12-05 DIAGNOSIS — F41.9 ANXIETY: ICD-10-CM

## 2024-12-05 RX ORDER — ONDANSETRON 4 MG/1
TABLET, FILM COATED ORAL
COMMUNITY
Start: 2024-11-27

## 2024-12-05 RX ORDER — BUSPIRONE HYDROCHLORIDE 5 MG/1
10 TABLET ORAL 2 TIMES DAILY
Qty: 120 TABLET | Refills: 11 | Status: SHIPPED | OUTPATIENT
Start: 2024-12-05

## 2024-12-06 ENCOUNTER — APPOINTMENT (OUTPATIENT)
Dept: PRIMARY CARE | Facility: CLINIC | Age: 69
End: 2024-12-06
Payer: COMMERCIAL

## 2024-12-06 DIAGNOSIS — R63.5 WEIGHT GAIN: Primary | ICD-10-CM

## 2024-12-06 DIAGNOSIS — E66.812 CLASS 2 OBESITY DUE TO EXCESS CALORIES WITHOUT SERIOUS COMORBIDITY WITH BODY MASS INDEX (BMI) OF 37.0 TO 37.9 IN ADULT: ICD-10-CM

## 2024-12-06 DIAGNOSIS — E66.09 CLASS 2 OBESITY DUE TO EXCESS CALORIES WITHOUT SERIOUS COMORBIDITY WITH BODY MASS INDEX (BMI) OF 37.0 TO 37.9 IN ADULT: ICD-10-CM

## 2024-12-06 NOTE — PROGRESS NOTES
Subjective   Patient ID: Kristy Chapin is a 69 y.o. female who presents for Med Refill.    HPI   Pt here for 2 month f/u after starting zepbound on 10/16/24.   Starting wt 208 lbs BMI 38.19.  She needs to lose 10 lbs at least 5% to cont on meds    Month #1: Today her wt is: 202 lbs BMI 36.  She has lost 6 of the 10 lbs needed.  Does get nausea and some constipation but it resolves.  Cravings are down kye with sweets.     Month #2: wt:  183 lbs       BMI:  34.  She has lost 25 lbs of the 10 lbs needed.  She can continue on the zepbound injections having lost over 10% of her body wt. No constipation/diarrhea/abd pain/gerd.    Past Medical History:   Diagnosis Date    Allergic ??? Percocet    Anemia 1993    Anxiety 2008    Arthritis 2011 (in back)    Cataract 2019    Chronic pain     [place]    COPD (chronic obstructive pulmonary disease) (Multi)     Depression 2023    Disease of small blood vessels (CMS-HCC)     brain    Dyslipidemia     10 yr risk 9.7% 2023    Endometriosis 1992    Fractures 2022    History of COVID-19 12/2020    Hypertension     Joint pain 2011    Low back pain 2009    Lumbosacral disc disease 2012    Migraines     [facility]    Neck pain 2022    Osteoarthritis 2012    Peptic ulceration 2000    Peripheral neuropathy 2015    SVT (supraventricular tachycardia) (CMS-HCC)     Urinary tract infection 21489     Current Outpatient Medications   Medication Sig Dispense Refill    aspirin 81 mg EC tablet Take 1 tablet (81 mg) by mouth once daily.      atorvastatin (Lipitor) 40 mg tablet Take 1 tablet (40 mg) by mouth once daily. 90 tablet 0    busPIRone (Buspar) 5 mg tablet TAKE TWO TABLETS BY MOUTH TWO TIMES A  tablet 11    co-enzyme Q-10 30 mg capsule Take 1 capsule (30 mg) by mouth once daily.      fremanezumab (Ajovy Autoinjector) 225 mg/1.5 mL auto-injector Inject 1 Pen (225 mg) under the skin every 30 (thirty) days.      gabapentin (Neurontin) 300 mg capsule Take 1 capsule (300 mg) by mouth 2  "times a day. 60 capsule 2    ondansetron (Zofran) 4 mg tablet       sertraline (Zoloft) 100 mg tablet Take 1.5 tablets (150 mg) by mouth once daily. 135 tablet 3    SUMAtriptan (Imitrex) 100 mg tablet Take 1 tablet (100 mg) by mouth.      traMADol (Ultram) 50 mg tablet Take 1 tablet (50 mg) by mouth every 6 hours if needed for severe pain (7 - 10). Do not fill before November 13, 2024. 120 tablet 2    valsartan (Diovan) 80 mg tablet Take 1 tablet (80 mg) by mouth once daily. 90 tablet 1    tirzepatide, weight loss, (Zepbound) 7.5 mg/0.5 mL injection Inject 7.5 mg under the skin every 7 days. 2 mL 0     Current Facility-Administered Medications   Medication Dose Route Frequency Provider Last Rate Last Admin    lidocaine PF (Xylocaine) 10 mg/mL (1 %) injection 20 mg  2 mL infiltration Once Gil Schmitz PA-C        triamcinolone acetonide (Kenalog-40) injection 80 mg  80 mg intramuscular Once Gil Schmitz PA-C           Review of Systems see hpi    Objective   /74   Pulse 79   Ht 1.562 m (5' 1.5\")   Wt 83.4 kg (183 lb 12.8 oz)   SpO2 97%   BMI 34.17 kg/m²     Physical Exam  Vitals reviewed.   Constitutional:       Appearance: Normal appearance.   HENT:      Head: Normocephalic and atraumatic.   Cardiovascular:      Rate and Rhythm: Normal rate and regular rhythm.      Heart sounds: Normal heart sounds.   Pulmonary:      Effort: Pulmonary effort is normal.      Breath sounds: Normal breath sounds.   Abdominal:      General: Abdomen is flat. There is no distension.      Palpations: Abdomen is soft.      Tenderness: There is no abdominal tenderness.   Neurological:      Mental Status: She is alert.         Assessment/Plan   Assessment & Plan  Weight gain  Will increase the dose to 7.5 mg and will see her for month #3 wt check  Orders:    Follow Up In Primary Care - Established    tirzepatide, weight loss, (Zepbound) 7.5 mg/0.5 mL injection; Inject 7.5 mg under the skin every 7 days.    Follow " Up In Primary Care - Established; Future    Class 1 obesity due to excess calories without serious comorbidity with body mass index (BMI) of 34.0 to 34.9 in adult

## 2024-12-13 ENCOUNTER — OFFICE VISIT (OUTPATIENT)
Dept: PRIMARY CARE | Facility: CLINIC | Age: 69
End: 2024-12-13
Payer: COMMERCIAL

## 2024-12-13 VITALS
HEIGHT: 62 IN | DIASTOLIC BLOOD PRESSURE: 74 MMHG | BODY MASS INDEX: 33.82 KG/M2 | HEART RATE: 79 BPM | WEIGHT: 183.8 LBS | SYSTOLIC BLOOD PRESSURE: 120 MMHG | OXYGEN SATURATION: 97 %

## 2024-12-13 DIAGNOSIS — E66.09 CLASS 1 OBESITY DUE TO EXCESS CALORIES WITHOUT SERIOUS COMORBIDITY WITH BODY MASS INDEX (BMI) OF 34.0 TO 34.9 IN ADULT: ICD-10-CM

## 2024-12-13 DIAGNOSIS — E66.811 CLASS 1 OBESITY DUE TO EXCESS CALORIES WITHOUT SERIOUS COMORBIDITY WITH BODY MASS INDEX (BMI) OF 34.0 TO 34.9 IN ADULT: ICD-10-CM

## 2024-12-13 DIAGNOSIS — R63.5 WEIGHT GAIN: Primary | ICD-10-CM

## 2024-12-13 PROCEDURE — 3008F BODY MASS INDEX DOCD: CPT | Performed by: FAMILY MEDICINE

## 2024-12-13 PROCEDURE — 1124F ACP DISCUSS-NO DSCNMKR DOCD: CPT | Performed by: FAMILY MEDICINE

## 2024-12-13 PROCEDURE — 3074F SYST BP LT 130 MM HG: CPT | Performed by: FAMILY MEDICINE

## 2024-12-13 PROCEDURE — 99213 OFFICE O/P EST LOW 20 MIN: CPT | Performed by: FAMILY MEDICINE

## 2024-12-13 PROCEDURE — 1125F AMNT PAIN NOTED PAIN PRSNT: CPT | Performed by: FAMILY MEDICINE

## 2024-12-13 PROCEDURE — 1036F TOBACCO NON-USER: CPT | Performed by: FAMILY MEDICINE

## 2024-12-13 PROCEDURE — 3078F DIAST BP <80 MM HG: CPT | Performed by: FAMILY MEDICINE

## 2024-12-13 PROCEDURE — RXMED WILLOW AMBULATORY MEDICATION CHARGE

## 2024-12-13 PROCEDURE — 1159F MED LIST DOCD IN RCRD: CPT | Performed by: FAMILY MEDICINE

## 2024-12-13 ASSESSMENT — PAIN SCALES - GENERAL: PAINLEVEL_OUTOF10: 8

## 2024-12-13 NOTE — ASSESSMENT & PLAN NOTE
Will increase the dose to 7.5 mg and will see her for month #3 wt check  Orders:    Follow Up In Primary Care - Established    tirzepatide, weight loss, (Zepbound) 7.5 mg/0.5 mL injection; Inject 7.5 mg under the skin every 7 days.    Follow Up In Primary Care - Established; Future

## 2024-12-14 NOTE — ASSESSMENT & PLAN NOTE
Orders:    Follow Up In Primary Care - Established    tirzepatide, weight loss, (Zepbound) 10 mg/0.5 mL injection; Inject 10 mg under the skin every 7 days.    Follow Up In Primary Care - Established; Future

## 2024-12-14 NOTE — PROGRESS NOTES
Subjective   Patient ID: Kristy Chapin is a 69 y.o. female who presents for Weight Check.    HPI   Pt here for 3 month f/u after starting zepbound on 10/16/24.    Starting wt 208 lbs BMI 38.19.  She needs to lose 10 lbs at least 5% to cont on meds    Month #1: Today her wt is: 202 lbs BMI 36.  She has lost 6 of the 10 lbs needed.  Does get nausea and some constipation but it resolves.  Cravings are down kye with sweets.     Month #2: wt:  183 lbs       BMI:  34.  She has lost 25 lbs of the 10 lbs needed.  Currently on 5 mg dose. She can continue on the zepbound injections having lost over 10% of her body wt. No constipation/diarrhea/abd pain/gerd.    Month #3: wt:  177       BMI:  33      Total wt lost so far:  31 lbs total so far  Dose is 7.5 mg weekly.  Cravings for sweets have come back and she would like to increase the dose      Past Medical History:   Diagnosis Date    Allergic ??? Percocet    Anemia 1993    Anxiety 2008    Arthritis 2011 (in back)    Cataract 2019    Chronic pain     [place]    COPD (chronic obstructive pulmonary disease) (Multi)     Depression 2023    Disease of small blood vessels (CMS-HCC)     brain    Dyslipidemia     10 yr risk 9.7% 2023    Endometriosis 1992    Fractures 2022    History of COVID-19 12/2020    Hypertension     Joint pain 2011    Low back pain 2009    Lumbosacral disc disease 2012    Migraines     [facility]    Neck pain 2022    Osteoarthritis 2012    Peptic ulceration 2000    Peripheral neuropathy 2015    SVT (supraventricular tachycardia) (CMS-HCC)     Urinary tract infection 85189     Current Outpatient Medications   Medication Sig Dispense Refill    aspirin 81 mg EC tablet Take 1 tablet (81 mg) by mouth once daily.      atorvastatin (Lipitor) 40 mg tablet Take 1 tablet (40 mg) by mouth once daily. 90 tablet 0    busPIRone (Buspar) 5 mg tablet TAKE TWO TABLETS BY MOUTH TWO TIMES A  tablet 11    co-enzyme Q-10 30 mg capsule Take 1 capsule (30 mg) by mouth once  daily.      fremanezumab (Ajovy Autoinjector) 225 mg/1.5 mL auto-injector Inject 1 Pen (225 mg) under the skin every 30 (thirty) days.      gabapentin (Neurontin) 300 mg capsule Take 1 capsule (300 mg) by mouth 2 times a day. 60 capsule 2    ondansetron (Zofran) 4 mg tablet       sertraline (Zoloft) 100 mg tablet Take 1.5 tablets (150 mg) by mouth once daily. 135 tablet 3    SUMAtriptan (Imitrex) 100 mg tablet Take 1 tablet (100 mg) by mouth.      traMADol (Ultram) 50 mg tablet Take 1 tablet (50 mg) by mouth every 6 hours if needed for severe pain (7 - 10). Do not fill before November 13, 2024. 120 tablet 2    valsartan (Diovan) 80 mg tablet Take 1 tablet (80 mg) by mouth once daily. 90 tablet 1    tirzepatide, weight loss, (Zepbound) 10 mg/0.5 mL injection Inject 10 mg under the skin every 7 days. 6 mL 0     Current Facility-Administered Medications   Medication Dose Route Frequency Provider Last Rate Last Admin    lidocaine PF (Xylocaine) 10 mg/mL (1 %) injection 20 mg  2 mL infiltration Once Gil Schmitz PA-C        triamcinolone acetonide (Kenalog-40) injection 80 mg  80 mg intramuscular Once Gil Schmitz PA-C           Review of Systems see hpi    Objective   /72   Pulse 74   Wt 80.3 kg (177 lb)   SpO2 98%   BMI 32.90 kg/m²     Physical Exam  Vitals reviewed.   Constitutional:       Appearance: Normal appearance.   HENT:      Head: Normocephalic and atraumatic.   Cardiovascular:      Rate and Rhythm: Normal rate and regular rhythm.      Heart sounds: Normal heart sounds.   Pulmonary:      Effort: Pulmonary effort is normal.      Breath sounds: Normal breath sounds.   Abdominal:      General: Abdomen is flat. There is no distension.      Palpations: Abdomen is soft.      Tenderness: There is no abdominal tenderness.   Neurological:      Mental Status: She is alert.         Assessment/Plan   Assessment & Plan  Weight gain    Orders:    Follow Up In Primary Care - Established     tirzepatide, weight loss, (Zepbound) 10 mg/0.5 mL injection; Inject 10 mg under the skin every 7 days.    Follow Up In Primary Care - Established; Future    Class 1 obesity due to excess calories without serious comorbidity with body mass index (BMI) of 33.0 to 33.9 in adult    Orders:    tirzepatide, weight loss, (Zepbound) 10 mg/0.5 mL injection; Inject 10 mg under the skin every 7 days.

## 2024-12-16 ENCOUNTER — PHARMACY VISIT (OUTPATIENT)
Dept: PHARMACY | Facility: CLINIC | Age: 69
End: 2024-12-16
Payer: COMMERCIAL

## 2025-01-10 ENCOUNTER — OFFICE VISIT (OUTPATIENT)
Dept: PRIMARY CARE | Facility: CLINIC | Age: 70
End: 2025-01-10
Payer: COMMERCIAL

## 2025-01-10 VITALS
BODY MASS INDEX: 32.9 KG/M2 | SYSTOLIC BLOOD PRESSURE: 118 MMHG | DIASTOLIC BLOOD PRESSURE: 72 MMHG | WEIGHT: 177 LBS | OXYGEN SATURATION: 98 % | HEART RATE: 74 BPM

## 2025-01-10 DIAGNOSIS — R63.5 WEIGHT GAIN: Primary | ICD-10-CM

## 2025-01-10 DIAGNOSIS — E66.811 CLASS 1 OBESITY DUE TO EXCESS CALORIES WITHOUT SERIOUS COMORBIDITY WITH BODY MASS INDEX (BMI) OF 33.0 TO 33.9 IN ADULT: ICD-10-CM

## 2025-01-10 DIAGNOSIS — E66.09 CLASS 1 OBESITY DUE TO EXCESS CALORIES WITHOUT SERIOUS COMORBIDITY WITH BODY MASS INDEX (BMI) OF 33.0 TO 33.9 IN ADULT: ICD-10-CM

## 2025-01-10 PROCEDURE — 3074F SYST BP LT 130 MM HG: CPT | Performed by: FAMILY MEDICINE

## 2025-01-10 PROCEDURE — 99213 OFFICE O/P EST LOW 20 MIN: CPT | Performed by: FAMILY MEDICINE

## 2025-01-10 PROCEDURE — RXMED WILLOW AMBULATORY MEDICATION CHARGE

## 2025-01-10 PROCEDURE — 3078F DIAST BP <80 MM HG: CPT | Performed by: FAMILY MEDICINE

## 2025-01-10 PROCEDURE — 1159F MED LIST DOCD IN RCRD: CPT | Performed by: FAMILY MEDICINE

## 2025-01-10 PROCEDURE — 1126F AMNT PAIN NOTED NONE PRSNT: CPT | Performed by: FAMILY MEDICINE

## 2025-01-10 PROCEDURE — 1036F TOBACCO NON-USER: CPT | Performed by: FAMILY MEDICINE

## 2025-01-10 ASSESSMENT — PAIN SCALES - GENERAL: PAINLEVEL_OUTOF10: 0-NO PAIN

## 2025-01-10 NOTE — ASSESSMENT & PLAN NOTE
Orders:    tirzepatide, weight loss, (Zepbound) 10 mg/0.5 mL injection; Inject 10 mg under the skin every 7 days.

## 2025-01-13 ENCOUNTER — PHARMACY VISIT (OUTPATIENT)
Dept: PHARMACY | Facility: CLINIC | Age: 70
End: 2025-01-13
Payer: COMMERCIAL

## 2025-02-04 ENCOUNTER — TELEPHONE (OUTPATIENT)
Dept: PRIMARY CARE | Facility: CLINIC | Age: 70
End: 2025-02-04
Payer: COMMERCIAL

## 2025-02-04 DIAGNOSIS — U07.1 COVID-19: Primary | ICD-10-CM

## 2025-02-04 RX ORDER — NIRMATRELVIR AND RITONAVIR 300-100 MG
3 KIT ORAL 2 TIMES DAILY
Qty: 30 TABLET | Refills: 0 | Status: SHIPPED | OUTPATIENT
Start: 2025-02-04 | End: 2025-02-09

## 2025-02-04 NOTE — TELEPHONE ENCOUNTER
Patient is calling with sx of chest congestion and cough, started 2/3/25.  She missed work today because she is very achy.  She will test for covid and call back with the results.  In the meantime, she was given the OTC protocol for the symptoms.

## 2025-02-07 ENCOUNTER — TELEMEDICINE (OUTPATIENT)
Dept: PAIN MEDICINE | Facility: CLINIC | Age: 70
End: 2025-02-07
Payer: COMMERCIAL

## 2025-02-07 DIAGNOSIS — G43.009 MIGRAINE WITHOUT AURA AND WITHOUT STATUS MIGRAINOSUS, NOT INTRACTABLE: ICD-10-CM

## 2025-02-07 DIAGNOSIS — M54.16 LUMBAR RADICULOPATHY: ICD-10-CM

## 2025-02-07 PROCEDURE — 1036F TOBACCO NON-USER: CPT | Performed by: PHYSICIAN ASSISTANT

## 2025-02-07 PROCEDURE — 1125F AMNT PAIN NOTED PAIN PRSNT: CPT | Performed by: PHYSICIAN ASSISTANT

## 2025-02-07 PROCEDURE — 99214 OFFICE O/P EST MOD 30 MIN: CPT | Performed by: PHYSICIAN ASSISTANT

## 2025-02-07 PROCEDURE — 1160F RVW MEDS BY RX/DR IN RCRD: CPT | Performed by: PHYSICIAN ASSISTANT

## 2025-02-07 PROCEDURE — 1159F MED LIST DOCD IN RCRD: CPT | Performed by: PHYSICIAN ASSISTANT

## 2025-02-07 RX ORDER — TRAMADOL HYDROCHLORIDE 50 MG/1
50 TABLET ORAL EVERY 6 HOURS PRN
Qty: 120 TABLET | Refills: 2 | Status: SHIPPED | OUTPATIENT
Start: 2025-02-12 | End: 2025-05-13

## 2025-02-07 RX ORDER — GABAPENTIN 300 MG/1
300 CAPSULE ORAL 2 TIMES DAILY
Qty: 60 CAPSULE | Refills: 2 | Status: SHIPPED | OUTPATIENT
Start: 2025-02-12 | End: 2025-05-13

## 2025-02-07 ASSESSMENT — LIFESTYLE VARIABLES
SKIP TO QUESTIONS 9-10: 1
HOW OFTEN DO YOU HAVE A DRINK CONTAINING ALCOHOL: NEVER
HOW OFTEN DO YOU HAVE SIX OR MORE DRINKS ON ONE OCCASION: NEVER
HOW MANY STANDARD DRINKS CONTAINING ALCOHOL DO YOU HAVE ON A TYPICAL DAY: PATIENT DOES NOT DRINK
AUDIT-C TOTAL SCORE: 0

## 2025-02-07 ASSESSMENT — PAIN SCALES - GENERAL
PAINLEVEL_OUTOF10: 6
PAINLEVEL_OUTOF10: 6

## 2025-02-07 ASSESSMENT — PAIN DESCRIPTION - DESCRIPTORS: DESCRIPTORS: ACHING

## 2025-02-07 ASSESSMENT — PAIN - FUNCTIONAL ASSESSMENT: PAIN_FUNCTIONAL_ASSESSMENT: 0-10

## 2025-02-07 ASSESSMENT — ENCOUNTER SYMPTOMS: BACK PAIN: 1

## 2025-02-07 NOTE — PROGRESS NOTES
Subjective   Patient ID: Kristy Chapin is a 69 y.o. female who presents for Back Pain.  Virtual or Telephone Consent    An interactive audio and video telecommunication system which permits real time communications between the patient (at the originating site) and provider (at the distant site) was utilized to provide this telehealth service.   Verbal consent was requested and obtained from Kristy Chapin on this date, 02/07/25 for a telehealth visit.     Patient is a 69-year-old female with lumbar radiculopathy and history of migraines myofascial pain and GT bursitis who presents today for follow-up.  Patient has an upper respiratory infection.  Patient has been placed on Paxil bid and having some side effects due to taste.  She is starting to feel better but she is still very fatigued.  She did notes that her body wide fatigue and pain has been aggravated she denies any complaints or aggravations to her current radiculopathy.    Back Pain        Review of Systems   Musculoskeletal:  Positive for back pain.       Objective   Physical Exam    Assessment/Plan   Problem List Items Addressed This Visit             ICD-10-CM    Lumbar radiculopathy M54.16    Relevant Medications    gabapentin (Neurontin) 300 mg capsule (Start on 2/12/2025)    traMADol (Ultram) 50 mg tablet (Start on 2/12/2025)    Migraine without aura and without status migrainosus, not intractable G43.009    Relevant Medications    traMADol (Ultram) 50 mg tablet (Start on 2/12/2025)     I had nice discussion with the patient today our plan will be as follows.      Radiology: [ none at this time ]      Physically:  [ continue modification of activities, healthy lifestyle choice ]      Psychologically:  [ No acute psychological concerns. There are no mental health issues of which I am aware that are contributing to the patient's pain. There are no substance abuse or alcohol abuse issues of which I am aware that are contributing to the patient's pain.   ]      Medication: [ I will refill the patient's opioids today for 3 month.  The patient continues to see benefit and improvement in their quality of life and ability to maintain ADLs.  Patient educated about the risks of taking opioids and operating a motor vehicle.  Patient reports no adverse side effects to current medication regimen.  Current regimen does allow patient to maintain ADLs.  Patient reports no new neurologic symptoms, new pain areas, or exacerbation in pain today.  Patient reports they are happy with current treatment care path.    [place] was reviewed and was consistent with the history.    Patient has been educated on the risks, benefits, and alternatives of controlled substances as well as the proper way to store these medications.  The patient and I discussed the nature of this medication and its side effects.  We discussed tolerance, physical dependence, psychological dependence, addiction and opioid-induced hyperalgesia.  We discussed the potential need to wean from this medication.  We discussed the availability of programs that can help with this process if necessary.  We discussed safety issues related to opioids including safe storage.  We discussed the fact that the patient should not drive an automobile or operate heavy machinery while taking this medication.  A prescription for naloxone was offered to the patient.  The patient will be re-evaluated for the need to continue opioid therapy in 90 days. ]      Duration:  [ 3 months ]      Intervention:  [If patient has any increased respiratory symptoms or respikes of fevers nearing 102.5 she may consider going back to the urgent care or primary care for reevaluation]          Please note that this report has been produced using speech recognition software. It may contain errors related to grammar, punctuation or spelling. Electronically signed, but not reviewed.          Gil Schmitz PA-C 02/07/25 9:01 AM

## 2025-02-11 NOTE — PROGRESS NOTES
HPI:       Hypertension:          The patient has no issues          The patients cardiovascular risk factors include:         Cardiac Risk Factors  Age > 45-male, > 55-female:  YES  +1   Smoking:   NO   Sig. family hx of CHD*:  NO   Hypertension:   YES  +1   Diabetes:   NO   HDL < 35:   NO   HDL > 59:   YES  -1   Total: 1     *- Sig. family h/o CHD per NCEP = MI or sudden death at <56yo in   father or other 1st-degree male relative, or <66yo in mother or   other 1st-degree female relative           The patients adherence to the treatment regimen is Excellent         Responses to the medications has been Excellent    Hyperlipidemia:   The patient does not use medications that may worsen dyslipidemias (corticosteroids, progestins, anabolic steroids, diuretics, beta-blockers, amiodarone, cyclosporine, olanzapine). The patient exercises occasionally.  The patient is not known to have coexisting coronary artery disease.  --------------------------------------------------------------------------------  Pt here for 5 month f/u after starting zepbound on 10/16/24.    Starting wt 208 lbs BMI 38.19.  She needs to lose 10 lbs at least 5% to cont on meds    Month #1: Today her wt is: 202 lbs BMI 36.  She has lost 6 of the 10 lbs needed.  Does get nausea and some constipation but it resolves.  Cravings are down kye with sweets.     Month #2: wt:  183 lbs      BMI:  34.  She has lost 25 lbs of the 10 lbs needed.  Currently on 5 mg dose. She can continue on the zepbound injections having lost over 10% of her body wt. No constipation/diarrhea/abd pain/gerd.    Month #3: wt:  177      BMI:  33      Total wt lost so far:  31 lbs total so far  Dose is 7.5 mg weekly.  Cravings for sweets have come back and she would like to increase the drug    Month #5: wt:  153       BMI:  28     Total wt lost so far: 55 lbs total so far  Dosing is 10 mg weekly.  Is happy with current dose and wants to stay on 10 mg dose      MEDICATIONS:   Current  Outpatient Medications   Medication Sig Dispense Refill    aspirin 81 mg EC tablet Take 1 tablet (81 mg) by mouth once daily.      atorvastatin (Lipitor) 40 mg tablet Take 1 tablet (40 mg) by mouth once daily. 90 tablet 0    busPIRone (Buspar) 5 mg tablet TAKE TWO TABLETS BY MOUTH TWO TIMES A  tablet 11    co-enzyme Q-10 30 mg capsule Take 1 capsule (30 mg) by mouth once daily.      fremanezumab (Ajovy Autoinjector) 225 mg/1.5 mL auto-injector Inject 1 Pen (225 mg) under the skin every 30 (thirty) days.      gabapentin (Neurontin) 300 mg capsule Take 1 capsule (300 mg) by mouth 2 times a day. Do not fill before February 12, 2025. 60 capsule 2    ondansetron (Zofran) 4 mg tablet       sertraline (Zoloft) 100 mg tablet Take 1.5 tablets (150 mg) by mouth once daily. 135 tablet 3    SUMAtriptan (Imitrex) 100 mg tablet Take 1 tablet (100 mg) by mouth.      traMADol (Ultram) 50 mg tablet Take 1 tablet (50 mg) by mouth every 6 hours if needed for severe pain (7 - 10). Do not fill before February 12, 2025. 120 tablet 2    tirzepatide, weight loss, (Zepbound) 10 mg/0.5 mL injection Inject 10 mg under the skin every 7 days. 6 mL 1    valsartan (Diovan) 80 mg tablet Take 1 tablet (80 mg) by mouth once daily. 90 tablet 1     Current Facility-Administered Medications   Medication Dose Route Frequency Provider Last Rate Last Admin    lidocaine PF (Xylocaine) 10 mg/mL (1 %) injection 20 mg  2 mL infiltration Once Gil Schmitz PA-C        triamcinolone acetonide (Kenalog-40) injection 80 mg  80 mg intramuscular Once Gil Schmitz PA-C         ALLERGIES:   Allergies   Allergen Reactions    Duloxetine Other and Palpitations     nausea    Oxycodone-Acetaminophen Other     extreme fatigue     MEDICAL HISTORY:   Past Medical History:   Diagnosis Date    Allergic ??? Percocet    Anemia 1993    Anxiety 2008    Arthritis 2011 (in back)    Cataract 2019    Chronic pain     [place]    COPD (chronic obstructive  pulmonary disease) (Multi)     Depression 2023    Disease of small blood vessels     brain    Dyslipidemia     10 yr risk 9.7% 2023    Endometriosis 1992    Fractures 2022    History of COVID-19 12/2020    Hypertension     Joint pain 2011    Low back pain 2009    Lumbosacral disc disease 2012    Migraines     [facility]    Neck pain 2022    Osteoarthritis 2012    Peptic ulceration 2000    Peripheral neuropathy 2015    SVT (supraventricular tachycardia) ([facility])     Urinary tract infection 16275         SOCIAL HISTORY:   Social History     Tobacco Use    Smoking status: Former     Current packs/day: 0.00     Average packs/day: 4.0 packs/day for 53.9 years (215.7 ttl pk-yrs)     Types: Cigarettes, Cigars     Start date: 1/1/1967     Quit date: 12/6/2014     Years since quitting: 10.3    Smokeless tobacco: Never    Tobacco comments:     3 to 4 pks a day   Vaping Use    Vaping status: Never Used   Substance Use Topics    Alcohol use: Not Currently     Alcohol/week: 6.0 standard drinks of alcohol     Types: 6 Cans of beer per week     Comment: Not drank since 2008    Drug use: Never     Comment: I'm not a drug user         ROS:      CONSTITUTION:  alert and oriented     OPHTHALMOLOGY:          no Change in vision.         CARDIOLOGY:          no Chest pain.  no Dyspnea on exertion.  no Shortness of breath.         ENDOCRINOLOGY:          no Excessive thirst.  no Excessive urination.  no Fatigue.  no Skin changes.  no Weight gain.  no Weight loss.         SKIN:          no Healing problems.         NEUROLOGY:          no Loss of sensation in specific body area.  no Burning pain in feet.  no Peripheral neuropathy.  no Tingling/numbness.    LABS  Lab Results   Component Value Date    GLUCOSE 100 (H) 09/13/2024    CALCIUM 9.6 09/13/2024     09/13/2024    K 4.6 09/13/2024    CO2 26 09/13/2024     09/13/2024    BUN 12 09/13/2024    CREATININE 0.69 09/13/2024     Lab Results   Component Value Date    CHOL 212 (H)  "09/13/2024    CHOL 178 10/30/2023    CHOL 258 (H) 11/19/2021     Lab Results   Component Value Date    HDL 93.2 09/13/2024    HDL 87.4 10/30/2023    HDL 93 11/19/2021     Lab Results   Component Value Date    LDLCALC 101 (H) 09/13/2024    LDLCALC 70 10/30/2023    LDLCALC 153 (H) 11/19/2021     Lab Results   Component Value Date    TRIG 91 09/13/2024    TRIG 102 10/30/2023    TRIG 58 11/19/2021     No components found for: \"CHOLHDL\"   Latest Reference Range & Units 09/13/24 08:34   Albumin, Urine Random Not established mg/L 15.1   Creatinine, Urine Random 20.0 - 320.0 mg/dL 138.2   Albumin/Creatinine Ratio <30.0 ug/mg Creat 10.9          VITAL SIGNS:  /78   Pulse 75   Ht 1.562 m (5' 1.5\")   Wt 69.5 kg (153 lb 3.2 oz)   SpO2 97%   BMI 28.48 kg/m²     General Appearance: awake, alert, oriented, in no acute distress  Lungs: Normal expansion.  Clear to auscultation.  No rales, rhonchi, or wheezing.  Heart: Heart sounds are normal.  Regular rate and rhythm without murmur, gallop or rub.  Extremities: Extremities warm to touch, pink, with no edema.  Neurologic: Alert and oriented x 3, gait normal., reflexes normal and symmetric, strength and  sensation grossly normal  Skin: bilateral thumb nails and index nails with peeling and cracking of nails near bottom cuticle    Kristy was seen today for hypertension and weight check.  Diagnoses and all orders for this visit:  Primary hypertension (Primary)  -     Follow Up In Primary Care - Established  -     valsartan (Diovan) 80 mg tablet; Take 1 tablet (80 mg) by mouth once daily.  -     Follow Up In Primary Care - Established; Future  Mixed hyperlipidemia  Weight gain  -     Follow Up In Primary Care - Established  -     tirzepatide, weight loss, (Zepbound) 10 mg/0.5 mL injection; Inject 10 mg under the skin every 7 days.  -     Follow Up In Primary Care - Established; Future  Overweight with body mass index (BMI) of 28 to 28.9 in adult  Fingernail abnormalities  -     " Referral to Dermatology

## 2025-02-13 ENCOUNTER — TELEPHONE (OUTPATIENT)
Dept: PRIMARY CARE | Facility: CLINIC | Age: 70
End: 2025-02-13
Payer: COMMERCIAL

## 2025-02-13 NOTE — TELEPHONE ENCOUNTER
Patient requesting a return back to work letter for Sunday 2/16/25  When completed if dr is okay with it asking that note be faxed to [facility] [phone] attn fela   Last seen in office 1/10/25 next scheduled appt 3/28/25

## 2025-02-13 NOTE — Clinical Note
February 13, 2025     Kristy SHILPI Tavares    Patient: Kristy Chapin   YOB: 1955   Date of Visit: 2/13/2025       Dear Dr. Kristy Chapin:    Thank you for referring Kristy Chapin to me for evaluation. Below are my notes for this consultation.  If you have questions, please do not hesitate to call me. I look forward to following your patient along with you.       Sincerely,     Germaine Ham MD      CC: No Recipients  ______________________________________________________________________________________

## 2025-02-13 NOTE — LETTER
February 14, 2025     Patient: Kristy Chapin   YOB: 1955   Date of Visit: 2/13/2025       To Whom It May Concern:    Please excuse Kristy Chapin absence from work for the following dates 02/05/2025 through 02/16/2025.    If you have any questions or concerns, please don't hesitate to call.         Sincerely,         Germaine Ham MD        CC: Kristy Chapin

## 2025-02-14 NOTE — TELEPHONE ENCOUNTER
Patient confirmed letter is needed from 02/05/2025 through 02/16/2025. Return to work letter written and faxed

## 2025-03-28 ENCOUNTER — OFFICE VISIT (OUTPATIENT)
Dept: PRIMARY CARE | Facility: CLINIC | Age: 70
End: 2025-03-28
Payer: COMMERCIAL

## 2025-03-28 VITALS
SYSTOLIC BLOOD PRESSURE: 128 MMHG | HEART RATE: 75 BPM | HEIGHT: 62 IN | DIASTOLIC BLOOD PRESSURE: 78 MMHG | OXYGEN SATURATION: 97 % | WEIGHT: 153.2 LBS | BODY MASS INDEX: 28.19 KG/M2

## 2025-03-28 DIAGNOSIS — R63.5 WEIGHT GAIN: ICD-10-CM

## 2025-03-28 DIAGNOSIS — E66.3 OVERWEIGHT WITH BODY MASS INDEX (BMI) OF 28 TO 28.9 IN ADULT: ICD-10-CM

## 2025-03-28 DIAGNOSIS — E78.2 MIXED HYPERLIPIDEMIA: ICD-10-CM

## 2025-03-28 DIAGNOSIS — L60.9 FINGERNAIL ABNORMALITIES: ICD-10-CM

## 2025-03-28 DIAGNOSIS — I10 PRIMARY HYPERTENSION: Primary | ICD-10-CM

## 2025-03-28 PROCEDURE — 3078F DIAST BP <80 MM HG: CPT | Performed by: FAMILY MEDICINE

## 2025-03-28 PROCEDURE — 3074F SYST BP LT 130 MM HG: CPT | Performed by: FAMILY MEDICINE

## 2025-03-28 PROCEDURE — 1160F RVW MEDS BY RX/DR IN RCRD: CPT | Performed by: FAMILY MEDICINE

## 2025-03-28 PROCEDURE — 1036F TOBACCO NON-USER: CPT | Performed by: FAMILY MEDICINE

## 2025-03-28 PROCEDURE — RXMED WILLOW AMBULATORY MEDICATION CHARGE

## 2025-03-28 PROCEDURE — 3008F BODY MASS INDEX DOCD: CPT | Performed by: FAMILY MEDICINE

## 2025-03-28 PROCEDURE — 1159F MED LIST DOCD IN RCRD: CPT | Performed by: FAMILY MEDICINE

## 2025-03-28 PROCEDURE — 1125F AMNT PAIN NOTED PAIN PRSNT: CPT | Performed by: FAMILY MEDICINE

## 2025-03-28 PROCEDURE — 99214 OFFICE O/P EST MOD 30 MIN: CPT | Performed by: FAMILY MEDICINE

## 2025-03-28 RX ORDER — VALSARTAN 80 MG/1
80 TABLET ORAL DAILY
Qty: 90 TABLET | Refills: 1 | Status: SHIPPED | OUTPATIENT
Start: 2025-03-28 | End: 2025-09-24

## 2025-03-28 ASSESSMENT — PATIENT HEALTH QUESTIONNAIRE - PHQ9
SUM OF ALL RESPONSES TO PHQ9 QUESTIONS 1 AND 2: 0
2. FEELING DOWN, DEPRESSED OR HOPELESS: NOT AT ALL
1. LITTLE INTEREST OR PLEASURE IN DOING THINGS: NOT AT ALL

## 2025-03-28 ASSESSMENT — PAIN SCALES - GENERAL: PAINLEVEL_OUTOF10: 5

## 2025-04-03 ENCOUNTER — PHARMACY VISIT (OUTPATIENT)
Dept: PHARMACY | Facility: CLINIC | Age: 70
End: 2025-04-03
Payer: COMMERCIAL

## 2025-04-10 DIAGNOSIS — E78.2 MIXED HYPERLIPIDEMIA: ICD-10-CM

## 2025-04-10 DIAGNOSIS — Z87.891 HISTORY OF TOBACCO USE: Primary | ICD-10-CM

## 2025-04-10 NOTE — TELEPHONE ENCOUNTER
Last seen: 03/28/2025   Upcoming Appt: 09/12/2025   Last Filled:    10/04/2024 #90 no refills   Last Lipid: 09/13/2024

## 2025-04-11 RX ORDER — ATORVASTATIN CALCIUM 40 MG/1
40 TABLET, FILM COATED ORAL DAILY
Qty: 90 TABLET | Refills: 1 | Status: SHIPPED | OUTPATIENT
Start: 2025-04-11

## 2025-04-17 ENCOUNTER — TELEPHONE (OUTPATIENT)
Dept: PAIN MEDICINE | Facility: CLINIC | Age: 70
End: 2025-04-17
Payer: COMMERCIAL

## 2025-04-17 NOTE — TELEPHONE ENCOUNTER
Pt called stating that she received a letter stating the [facility] will not longer be covered by her insurance. After RN consulted with , RN was able to tell the pt that the letter was sent due to the providers being re-credentialed with . RN let pt know that her visit will be covered. Pt stated understanding.

## 2025-04-25 ENCOUNTER — APPOINTMENT (OUTPATIENT)
Dept: PAIN MEDICINE | Facility: CLINIC | Age: 70
End: 2025-04-25
Payer: COMMERCIAL

## 2025-04-25 VITALS
BODY MASS INDEX: 27.42 KG/M2 | SYSTOLIC BLOOD PRESSURE: 117 MMHG | HEART RATE: 84 BPM | HEIGHT: 62 IN | RESPIRATION RATE: 18 BRPM | WEIGHT: 149 LBS | DIASTOLIC BLOOD PRESSURE: 77 MMHG | OXYGEN SATURATION: 97 %

## 2025-04-25 DIAGNOSIS — B35.1 ONYCHOMYCOSIS: Primary | ICD-10-CM

## 2025-04-25 DIAGNOSIS — G43.009 MIGRAINE WITHOUT AURA AND WITHOUT STATUS MIGRAINOSUS, NOT INTRACTABLE: ICD-10-CM

## 2025-04-25 DIAGNOSIS — M54.16 LUMBAR RADICULOPATHY: ICD-10-CM

## 2025-04-25 PROCEDURE — 1160F RVW MEDS BY RX/DR IN RCRD: CPT | Performed by: PHYSICIAN ASSISTANT

## 2025-04-25 PROCEDURE — 1125F AMNT PAIN NOTED PAIN PRSNT: CPT | Performed by: PHYSICIAN ASSISTANT

## 2025-04-25 PROCEDURE — 1159F MED LIST DOCD IN RCRD: CPT | Performed by: PHYSICIAN ASSISTANT

## 2025-04-25 PROCEDURE — 3074F SYST BP LT 130 MM HG: CPT | Performed by: PHYSICIAN ASSISTANT

## 2025-04-25 PROCEDURE — 3008F BODY MASS INDEX DOCD: CPT | Performed by: PHYSICIAN ASSISTANT

## 2025-04-25 PROCEDURE — 3078F DIAST BP <80 MM HG: CPT | Performed by: PHYSICIAN ASSISTANT

## 2025-04-25 PROCEDURE — 99214 OFFICE O/P EST MOD 30 MIN: CPT | Performed by: PHYSICIAN ASSISTANT

## 2025-04-25 PROCEDURE — 1036F TOBACCO NON-USER: CPT | Performed by: PHYSICIAN ASSISTANT

## 2025-04-25 RX ORDER — GABAPENTIN 300 MG/1
300 CAPSULE ORAL 2 TIMES DAILY
Qty: 60 CAPSULE | Refills: 2 | Status: SHIPPED | OUTPATIENT
Start: 2025-04-25 | End: 2025-07-24

## 2025-04-25 RX ORDER — TRAMADOL HYDROCHLORIDE 50 MG/1
50 TABLET ORAL EVERY 6 HOURS PRN
Qty: 120 TABLET | Refills: 2 | Status: SHIPPED | OUTPATIENT
Start: 2025-05-13 | End: 2025-08-11

## 2025-04-25 ASSESSMENT — ENCOUNTER SYMPTOMS
NAUSEA: 0
SLEEP DISTURBANCE: 0
BACK PAIN: 1
UNEXPECTED WEIGHT CHANGE: 0
CHEST TIGHTNESS: 0
FEVER: 0
ARTHRALGIAS: 1
MYALGIAS: 1
VOICE CHANGE: 0
SORE THROAT: 0
PALPITATIONS: 0
FATIGUE: 0
VOMITING: 0
DIARRHEA: 0
SHORTNESS OF BREATH: 0
COUGH: 0
ACTIVITY CHANGE: 0
CHILLS: 0

## 2025-04-25 ASSESSMENT — PATIENT HEALTH QUESTIONNAIRE - PHQ9
2. FEELING DOWN, DEPRESSED OR HOPELESS: NOT AT ALL
1. LITTLE INTEREST OR PLEASURE IN DOING THINGS: NOT AT ALL
SUM OF ALL RESPONSES TO PHQ9 QUESTIONS 1 & 2: 0

## 2025-04-25 ASSESSMENT — PAIN DESCRIPTION - DESCRIPTORS: DESCRIPTORS: ACHING

## 2025-04-25 ASSESSMENT — PAIN - FUNCTIONAL ASSESSMENT: PAIN_FUNCTIONAL_ASSESSMENT: 0-10

## 2025-04-25 ASSESSMENT — LIFESTYLE VARIABLES
HOW MANY STANDARD DRINKS CONTAINING ALCOHOL DO YOU HAVE ON A TYPICAL DAY: PATIENT DOES NOT DRINK
HOW OFTEN DO YOU HAVE A DRINK CONTAINING ALCOHOL: NEVER
SKIP TO QUESTIONS 9-10: 1
AUDIT-C TOTAL SCORE: 0
HOW OFTEN DO YOU HAVE SIX OR MORE DRINKS ON ONE OCCASION: NEVER

## 2025-04-25 ASSESSMENT — PAIN SCALES - GENERAL
PAINLEVEL_OUTOF10: 7
PAINLEVEL_OUTOF10: 7

## 2025-04-25 NOTE — PROGRESS NOTES
MEDICATION NAME: Tramadol   STRENGTH: 50mg  LAST FILL DATE: 25  DATE LAST TAKEN: 25  QUANTITY FILLED: 120  QUANTITY REMAININ  COUNT COMPLETED BY: JEN PETER RN and SASKIA STEEL      UDS LAST COMPLETED:   CONTROLLED SUBSTANCES AGREEMENT LAST SIGNED:   ORT LAST COMPLETED:  Modified Osw[employer] disability form filled out annually.

## 2025-04-25 NOTE — PROGRESS NOTES
Subjective   Patient ID: Kristy Chapin is a 70 y.o. female who presents for Back Pain.  Patient is a 70-year-old female with lumbosacral radiculopathy migraines vertigo the presents today for follow-up.  Her neurologist has ordered her prednisone and a PPI to help with her pains and symptoms.  The migraine treatments have been getting more beneficial.  Patient did notes that she has an appointment to be scheduled for dermatology due to loss of nails on her fingers the thumb and first digits have not regrown and are tender.    Back Pain  Pertinent negatives include no chest pain or fever.       Review of Systems   Constitutional:  Negative for activity change, chills, fatigue, fever and unexpected weight change.   HENT:  Negative for ear pain, sore throat and voice change.    Eyes:  Negative for visual disturbance.   Respiratory:  Negative for cough, chest tightness and shortness of breath.    Cardiovascular:  Negative for chest pain and palpitations.   Gastrointestinal:  Negative for diarrhea, nausea and vomiting.   Musculoskeletal:  Positive for arthralgias, back pain and myalgias.   Psychiatric/Behavioral:  Negative for behavioral problems, self-injury, sleep disturbance and suicidal ideas.        Objective   Physical Exam    Assessment/Plan   Problem List Items Addressed This Visit           ICD-10-CM    Lumbar radiculopathy M54.16    Relevant Medications    traMADol (Ultram) 50 mg tablet (Start on 5/13/2025)    gabapentin (Neurontin) 300 mg capsule    Migraine without aura and without status migrainosus, not intractable G43.009    Relevant Medications    traMADol (Ultram) 50 mg tablet (Start on 5/13/2025)     Other Visit Diagnoses         Codes      Onychomycosis    -  Primary B35.1        I had nice discussion with the patient today our plan will be as follows.      Radiology: [ none at this time ]      Physically:  [ continue modification of activities, healthy lifestyle choice ]      Psychologically:  [ No  acute psychological concerns. There are no mental health issues of which I am aware that are contributing to the patient's pain. There are no substance abuse or alcohol abuse issues of which I am aware that are contributing to the patient's pain.  ]      Medication: [ I will refill the patient's opioids today for 3 month.  The patient continues to see benefit and improvement in their quality of life and ability to maintain ADLs.  Patient educated about the risks of taking opioids and operating a motor vehicle.  Patient reports no adverse side effects to current medication regimen.  Current regimen does allow patient to maintain ADLs.  Patient reports no new neurologic symptoms, new pain areas, or exacerbation in pain today.  Patient reports they are happy with current treatment care path.    [place] was reviewed and was consistent with the history.    Patient has been educated on the risks, benefits, and alternatives of controlled substances as well as the proper way to store these medications.  The patient and I discussed the nature of this medication and its side effects.  We discussed tolerance, physical dependence, psychological dependence, addiction and opioid-induced hyperalgesia.  We discussed the potential need to wean from this medication.  We discussed the availability of programs that can help with this process if necessary.  We discussed safety issues related to opioids including safe storage.  We discussed the fact that the patient should not drive an automobile or operate heavy machinery while taking this medication.  A prescription for naloxone was offered to the patient.  The patient will be re-evaluated for the need to continue opioid therapy in 90 days. ]      Duration:  [ 3 months ]      Intervention:  [Continue to work with Neurology for the migraines and the vertigo issues.  Patient should be cautious with her ambulation.  Patient can to use her successful weight loss.  Patient should continue to  work with dermatology for the fingernail issues]          Please note that this report has been produced using speech recognition software. It may contain errors related to grammar, punctuation or spelling. Electronically signed, but not reviewed.            Gil Schmitz PA-C 04/25/25 8:54 AM

## 2025-05-02 ENCOUNTER — APPOINTMENT (OUTPATIENT)
Dept: PAIN MEDICINE | Facility: CLINIC | Age: 70
End: 2025-05-02
Payer: COMMERCIAL

## 2025-05-23 ENCOUNTER — APPOINTMENT (OUTPATIENT)
Dept: RADIOLOGY | Facility: HOSPITAL | Age: 70
End: 2025-05-23
Payer: COMMERCIAL

## 2025-05-23 DIAGNOSIS — Z87.891 HISTORY OF TOBACCO USE: ICD-10-CM

## 2025-05-23 PROCEDURE — 71271 CT THORAX LUNG CANCER SCR C-: CPT

## 2025-06-09 ENCOUNTER — OFFICE VISIT (OUTPATIENT)
Dept: PRIMARY CARE | Facility: CLINIC | Age: 70
End: 2025-06-09
Payer: COMMERCIAL

## 2025-06-09 VITALS
BODY MASS INDEX: 25.65 KG/M2 | DIASTOLIC BLOOD PRESSURE: 86 MMHG | OXYGEN SATURATION: 99 % | WEIGHT: 138 LBS | HEART RATE: 81 BPM | SYSTOLIC BLOOD PRESSURE: 132 MMHG

## 2025-06-09 DIAGNOSIS — Z12.31 ENCOUNTER FOR SCREENING MAMMOGRAM FOR MALIGNANT NEOPLASM OF BREAST: ICD-10-CM
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
## 2025-06-09 DIAGNOSIS — S90.861A INSECT BITE OF RIGHT FOOT, INITIAL ENCOUNTER: Primary | ICD-10-CM

## 2025-06-09 DIAGNOSIS — Z23 NEED FOR VACCINATION: ICD-10-CM

## 2025-06-09 PROBLEM — R53.83 FATIGUE: Status: RESOLVED | Noted: 2023-09-20 | Resolved: 2025-06-09

## 2025-06-09 PROBLEM — R42 DIZZINESS: Status: RESOLVED | Noted: 2023-09-20 | Resolved: 2025-06-09

## 2025-06-09 PROBLEM — E66.811 CLASS 1 OBESITY DUE TO EXCESS CALORIES WITHOUT SERIOUS COMORBIDITY WITH BODY MASS INDEX (BMI) OF 33.0 TO 33.9 IN ADULT: Status: RESOLVED | Noted: 2024-10-15 | Resolved: 2025-06-09

## 2025-06-09 PROBLEM — E66.09 CLASS 1 OBESITY DUE TO EXCESS CALORIES WITHOUT SERIOUS COMORBIDITY WITH BODY MASS INDEX (BMI) OF 33.0 TO 33.9 IN ADULT: Status: RESOLVED | Noted: 2024-10-15 | Resolved: 2025-06-09

## 2025-06-09 PROBLEM — E66.3 OVERWEIGHT WITH BODY MASS INDEX (BMI) OF 28 TO 28.9 IN ADULT: Status: RESOLVED | Noted: 2025-03-28 | Resolved: 2025-06-09

## 2025-06-09 PROBLEM — R63.5 WEIGHT GAIN: Status: RESOLVED | Noted: 2024-12-13 | Resolved: 2025-06-09

## 2025-06-09 PROCEDURE — 90715 TDAP VACCINE 7 YRS/> IM: CPT | Performed by: FAMILY MEDICINE

## 2025-06-09 PROCEDURE — 99213 OFFICE O/P EST LOW 20 MIN: CPT | Performed by: FAMILY MEDICINE

## 2025-06-09 PROCEDURE — 1159F MED LIST DOCD IN RCRD: CPT | Performed by: FAMILY MEDICINE

## 2025-06-09 PROCEDURE — 1036F TOBACCO NON-USER: CPT | Performed by: FAMILY MEDICINE

## 2025-06-09 PROCEDURE — 90471 IMMUNIZATION ADMIN: CPT | Performed by: FAMILY MEDICINE

## 2025-06-09 PROCEDURE — 3079F DIAST BP 80-89 MM HG: CPT | Performed by: FAMILY MEDICINE

## 2025-06-09 PROCEDURE — 1125F AMNT PAIN NOTED PAIN PRSNT: CPT | Performed by: FAMILY MEDICINE

## 2025-06-09 PROCEDURE — 3075F SYST BP GE 130 - 139MM HG: CPT | Performed by: FAMILY MEDICINE

## 2025-06-09 RX ORDER — CEPHALEXIN 500 MG/1
500 CAPSULE ORAL 2 TIMES DAILY
Qty: 14 CAPSULE | Refills: 0 | Status: SHIPPED | OUTPATIENT
Start: 2025-06-09 | End: 2025-06-16

## 2025-06-09 ASSESSMENT — PAIN SCALES - GENERAL: PAINLEVEL_OUTOF10: 6

## 2025-06-09 NOTE — PROGRESS NOTES
Subjective   Patient ID: Kristy Chapin is a 70 y.o. female who presents for Insect Bite.    HPI   Pt comes in thinking she may have been bit by a spider on her right foot 2 days ago.  She put her foot into her house shoe and felt something sharp.  Turned red the day after.  There was an initial blister in between the second and third toes. It is red and warm to touch now    Past Medical History:   Diagnosis Date    Allergic ??? Percocet    Anemia 1993    Anxiety 2008    Arthritis 2011 (in back)    Cataract 2019    Chronic pain     [place]    COPD (chronic obstructive pulmonary disease) (Multi)     Depression 2023    Disease of small blood vessels     brain    Dyslipidemia     10 yr risk 9.7% 2023    Endometriosis 1992    Fractures 2022    History of COVID-19 12/2020    Hypertension     Joint pain 2011    Low back pain 2009    Lumbosacral disc disease 2012    Migraines     [facility]    Neck pain 2022    Osteoarthritis 2012    Peptic ulceration 2000    Peripheral neuropathy 2015    SVT (supraventricular tachycardia)     Urinary tract infection 44556     Current Outpatient Medications   Medication Sig Dispense Refill    aspirin 81 mg EC tablet Take 1 tablet (81 mg) by mouth once daily.      atorvastatin (Lipitor) 40 mg tablet Take 1 tablet (40 mg) by mouth once daily. 90 tablet 1    busPIRone (Buspar) 5 mg tablet TAKE TWO TABLETS BY MOUTH TWO TIMES A  tablet 11    co-enzyme Q-10 30 mg capsule Take 1 capsule (30 mg) by mouth once daily.      fremanezumab (Ajovy Autoinjector) 225 mg/1.5 mL auto-injector Inject 1 Pen (225 mg) under the skin every 30 (thirty) days.      gabapentin (Neurontin) 300 mg capsule Take 1 capsule (300 mg) by mouth 2 times a day. 60 capsule 2    ondansetron (Zofran) 4 mg tablet       sertraline (Zoloft) 100 mg tablet Take 1.5 tablets (150 mg) by mouth once daily. 135 tablet 3    SUMAtriptan (Imitrex) 100 mg tablet Take 1 tablet (100 mg) by mouth.      tirzepatide, weight loss, (Zepbound)  10 mg/0.5 mL injection Inject 10 mg under the skin every 7 days. 6 mL 1    traMADol (Ultram) 50 mg tablet Take 1 tablet (50 mg) by mouth every 6 hours if needed for severe pain (7 - 10). Do not fill before May 13, 2025. 120 tablet 2    valsartan (Diovan) 80 mg tablet Take 1 tablet (80 mg) by mouth once daily. 90 tablet 1    cephalexin (Keflex) 500 mg capsule Take 1 capsule (500 mg) by mouth 2 times a day for 7 days. 14 capsule 0     Current Facility-Administered Medications   Medication Dose Route Frequency Provider Last Rate Last Admin    lidocaine PF (Xylocaine) 10 mg/mL (1 %) injection 20 mg  2 mL infiltration Once Gil Schmitz PA-C        triamcinolone acetonide (Kenalog-40) injection 80 mg  80 mg intramuscular Once Gil Schmitz PA-C           Review of Systems see hpi    Objective   /86   Pulse 81   Wt 62.6 kg (138 lb)   SpO2 99%   BMI 25.65 kg/m²     Physical Exam  Vitals reviewed.   Constitutional:       Appearance: Normal appearance.   Skin:     Comments: Right foot: in between second and third toes and on top of foot near base of toes is ecchymosis; warm to touch; small vesicle seen in between toes.     Neurological:      Mental Status: She is alert.         Assessment/Plan   Assessment & Plan  Insect bite of right foot, initial encounter  Ice, elevate, nsaid for pain; will treat prophylactically for infection.  Orders:    cephalexin (Keflex) 500 mg capsule; Take 1 capsule (500 mg) by mouth 2 times a day for 7 days.    Need for vaccination    Orders:    Tdap vaccine, age 7 years and older  (BOOSTRIX)    Encounter for screening mammogram for malignant neoplasm of breast    Orders:    BI mammo bilateral screening tomosynthesis; Future

## 2025-06-13 ENCOUNTER — HOSPITAL ENCOUNTER (OUTPATIENT)
Dept: RADIOLOGY | Facility: HOSPITAL | Age: 70
Discharge: HOME | End: 2025-06-13
Payer: COMMERCIAL

## 2025-06-13 VITALS — WEIGHT: 138 LBS | BODY MASS INDEX: 26.06 KG/M2 | HEIGHT: 61 IN

## 2025-06-13 DIAGNOSIS — Z12.31 ENCOUNTER FOR SCREENING MAMMOGRAM FOR MALIGNANT NEOPLASM OF BREAST: ICD-10-CM

## 2025-06-13 PROCEDURE — 77067 SCR MAMMO BI INCL CAD: CPT

## 2025-06-23 PROCEDURE — RXMED WILLOW AMBULATORY MEDICATION CHARGE

## 2025-06-25 ENCOUNTER — PHARMACY VISIT (OUTPATIENT)
Dept: PHARMACY | Facility: CLINIC | Age: 70
End: 2025-06-25
Payer: COMMERCIAL

## 2025-08-01 ENCOUNTER — OFFICE VISIT (OUTPATIENT)
Facility: CLINIC | Age: 70
End: 2025-08-01
Payer: COMMERCIAL

## 2025-08-01 VITALS
WEIGHT: 125 LBS | RESPIRATION RATE: 18 BRPM | HEART RATE: 75 BPM | HEIGHT: 61 IN | BODY MASS INDEX: 23.6 KG/M2 | DIASTOLIC BLOOD PRESSURE: 91 MMHG | SYSTOLIC BLOOD PRESSURE: 145 MMHG | OXYGEN SATURATION: 99 %

## 2025-08-01 DIAGNOSIS — M54.16 LUMBAR RADICULOPATHY: ICD-10-CM

## 2025-08-01 DIAGNOSIS — G43.009 MIGRAINE WITHOUT AURA AND WITHOUT STATUS MIGRAINOSUS, NOT INTRACTABLE: ICD-10-CM

## 2025-08-01 DIAGNOSIS — Z79.899 HISTORY OF ONGOING TREATMENT WITH HIGH-RISK MEDICATION: Primary | ICD-10-CM

## 2025-08-01 PROCEDURE — 1125F AMNT PAIN NOTED PAIN PRSNT: CPT | Performed by: PHYSICIAN ASSISTANT

## 2025-08-01 PROCEDURE — 3077F SYST BP >= 140 MM HG: CPT | Performed by: PHYSICIAN ASSISTANT

## 2025-08-01 PROCEDURE — 99214 OFFICE O/P EST MOD 30 MIN: CPT | Performed by: PHYSICIAN ASSISTANT

## 2025-08-01 PROCEDURE — 3080F DIAST BP >= 90 MM HG: CPT | Performed by: PHYSICIAN ASSISTANT

## 2025-08-01 PROCEDURE — 3008F BODY MASS INDEX DOCD: CPT | Performed by: PHYSICIAN ASSISTANT

## 2025-08-01 PROCEDURE — 1160F RVW MEDS BY RX/DR IN RCRD: CPT | Performed by: PHYSICIAN ASSISTANT

## 2025-08-01 PROCEDURE — 1159F MED LIST DOCD IN RCRD: CPT | Performed by: PHYSICIAN ASSISTANT

## 2025-08-01 RX ORDER — TRAMADOL HYDROCHLORIDE 50 MG/1
50 TABLET, FILM COATED ORAL EVERY 6 HOURS PRN
Qty: 120 TABLET | Refills: 2 | Status: SHIPPED | OUTPATIENT
Start: 2025-08-14 | End: 2025-11-12

## 2025-08-01 RX ORDER — GABAPENTIN 300 MG/1
300 CAPSULE ORAL 2 TIMES DAILY
Qty: 60 CAPSULE | Refills: 2 | Status: SHIPPED | OUTPATIENT
Start: 2025-08-01 | End: 2025-10-30

## 2025-08-01 ASSESSMENT — PAIN SCALES - GENERAL
PAINLEVEL_OUTOF10: 7
PAINLEVEL_OUTOF10: 7

## 2025-08-01 ASSESSMENT — PAIN - FUNCTIONAL ASSESSMENT: PAIN_FUNCTIONAL_ASSESSMENT: 0-10

## 2025-08-01 ASSESSMENT — LIFESTYLE VARIABLES
HOW OFTEN DO YOU HAVE SIX OR MORE DRINKS ON ONE OCCASION: NEVER
SKIP TO QUESTIONS 9-10: 1
HOW OFTEN DO YOU HAVE A DRINK CONTAINING ALCOHOL: NEVER
AUDIT-C TOTAL SCORE: 0
HOW MANY STANDARD DRINKS CONTAINING ALCOHOL DO YOU HAVE ON A TYPICAL DAY: PATIENT DOES NOT DRINK

## 2025-08-01 ASSESSMENT — PAIN DESCRIPTION - DESCRIPTORS: DESCRIPTORS: ACHING;NUMBNESS

## 2025-08-01 ASSESSMENT — ENCOUNTER SYMPTOMS: BACK PAIN: 1

## 2025-08-01 NOTE — PROGRESS NOTES
MEDICATION NAME: Tramadol   STRENGTH: 50mg  LAST FILL DATE: 7/15/25  DATE LAST TAKEN: 25  QUANTITY FILLED: 120  QUANTITY REMAININ  COUNT COMPLETED BY: CARLOS JENSEN LPN and SASKIA STEEL      UDS COMPLETED  CONTROLLED SUBSTANCES AGREEMENT SIGNED  ORT COMPLETED  Modified Oswestry disability form filled out annually.

## 2025-08-01 NOTE — PROGRESS NOTES
Subjective   Patient ID: Kristy Chapin is a 70 y.o. female who presents for Back Pain.  Patient is a 70-year-old female with lumbar radiculopathy and history of migraines and long-term use of opiate analgesics who presents today for follow-up.  Patient has been having increasing ambulatory difficulties.  She is also been complaining of some peculiar numbness and increased light right lumbar radiculopathy he has follow-ups with her neurologist for her migraine treatments they did do multiple brain scans and neck scans.  She did notes that the migraines are getting better but she is now having peculiar intermittent thoracic radiculopathy symptoms and numbness and tingling he states that she is having a diagnosis of early onset Alzheimer's per her report.  She has had no major falls    Back Pain        Review of Systems   Musculoskeletal:  Positive for back pain.       Objective   Physical Exam  Vitals reviewed.   Constitutional:       Appearance: Normal appearance.   HENT:      Head: Normocephalic and atraumatic.      Mouth/Throat:      Mouth: Mucous membranes are moist.   Neck:      Vascular: No JVD.   Pulmonary:      Effort: Pulmonary effort is normal. No tachypnea or bradypnea.   Abdominal:      Palpations: Abdomen is soft.     Musculoskeletal:      Right elbow: No effusion or lacerations. Normal range of motion. Tenderness present in medial epicondyle.      Left elbow: No deformity, effusion or lacerations. Normal range of motion. Tenderness present in medial epicondyle.      Lumbar back: Spasms and tenderness present. Decreased range of motion. Positive right straight leg raise test. Negative left straight leg raise test.     Skin:     General: Skin is warm and dry.     Neurological:      Mental Status: She is alert and oriented to person, place, and time.      Gait: Gait abnormal.      Deep Tendon Reflexes:      Reflex Scores:       Patellar reflexes are 3+ on the right side.       Achilles reflexes are 3+ on  the right side and 3+ on the left side.     Comments: Neg clonus   Psychiatric:         Mood and Affect: Mood normal.         Behavior: Behavior normal. Behavior is cooperative.         Assessment/Plan   Problem List Items Addressed This Visit           ICD-10-CM    Lumbar radiculopathy M54.16    Relevant Medications    traMADol (Ultram) 50 mg tablet (Start on 8/14/2025)    gabapentin (Neurontin) 300 mg capsule    Other Relevant Orders    XR lumbar spine 4+ views w flexion extension    XR thoracic spine 3 views    Migraine without aura and without status migrainosus, not intractable G43.009    Relevant Medications    traMADol (Ultram) 50 mg tablet (Start on 8/14/2025)     Other Visit Diagnoses         Codes      History of ongoing treatment with high-risk medication    -  Primary Z79.899    Relevant Orders    Opiate/Opioid/Benzo Prescription Compliance        I had nice discussion with the patient today our plan will be as follows.      Radiology: [ Ordering new x-rays of the spine mid and low back for evaluation ]      Physically:  [ continue modification of activities, healthy lifestyle choice ]      Psychologically:  [ No acute psychological concerns. There are no mental health issues of which I am aware that are contributing to the patient's pain. There are no substance abuse or alcohol abuse issues of which I am aware that are contributing to the patient's pain.  ]      Medication: [ I will refill the patient's opioids today for 3 month.  The patient continues to see benefit and improvement in their quality of life and ability to maintain ADLs.  Patient educated about the risks of taking opioids and operating a motor vehicle.  Patient reports no adverse side effects to current medication regimen.  Current regimen does allow patient to maintain ADLs.  Patient reports no new neurologic symptoms, new pain areas, or exacerbation in pain today.  Patient reports they are happy with current treatment care path.     [place] was reviewed and was consistent with the history.    Patient has been educated on the risks, benefits, and alternatives of controlled substances as well as the proper way to store these medications.  The patient and I discussed the nature of this medication and its side effects.  We discussed tolerance, physical dependence, psychological dependence, addiction and opioid-induced hyperalgesia.  We discussed the potential need to wean from this medication.  We discussed the availability of programs that can help with this process if necessary.  We discussed safety issues related to opioids including safe storage.  We discussed the fact that the patient should not drive an automobile or operate heavy machinery while taking this medication.  A prescription for naloxone was offered to the patient.  The patient will be re-evaluated for the need to continue opioid therapy in 90 days.   Reviewed pain management agreement patient is electronically signed and toxicology screen will be submitted]      Duration:  [ 3 months ]      Intervention:  [  T-spine L-spine MRIs in the past do not show any critical findings that would explain her current symptoms.  I am in order new x-rays for an initial evaluation patient should call her neurologist and get an earlier appointment.  She is hyperreflexic in the bilateral lower extremities there is mild degenerative changes in the cervical spine the MRI in 2023 the MRA of the intracranial vasculature does not show any signs of local significant stenosis or aneurysm events there is T2 flair hyperintensities of white matter again noted unchanged from prior exam.  We had discussed potentially reducing patient's gabapentin as this could be causing symptoms but she reports that this is beneficial for her radicular leg component ]          Please note that this report has been produced using speech recognition software. It may contain errors related to grammar, punctuation or spelling.  Electronically signed, but not reviewed.            Gil Schmitz PA-C 08/01/25 8:40 AM

## 2025-08-03 LAB
1OH-MIDAZOLAM UR-MCNC: NEGATIVE NG/ML
7AMINOCLONAZEPAM UR-MCNC: NEGATIVE NG/ML
A-OH ALPRAZ UR-MCNC: NEGATIVE NG/ML
A-OH-TRIAZOLAM UR-MCNC: NEGATIVE NG/ML
AMPHETAMINES UR QL: NEGATIVE NG/ML
BARBITURATES UR QL: NEGATIVE NG/ML
BZE UR QL: NEGATIVE NG/ML
CODEINE UR-MCNC: NEGATIVE NG/ML
CREAT UR-MCNC: 39.9 MG/DL
DRUG SCREEN COMMENT UR-IMP: ABNORMAL
EDDP UR-MCNC: ABNORMAL NG/ML
FENTANYL UR-MCNC: ABNORMAL NG/ML
HYDROCODONE UR-MCNC: NEGATIVE NG/ML
HYDROMORPHONE UR-MCNC: NEGATIVE NG/ML
LORAZEPAM UR-MCNC: NEGATIVE NG/ML
METHADONE UR-MCNC: ABNORMAL UG/L
MORPHINE UR-MCNC: NEGATIVE NG/ML
NORDIAZEPAM UR-MCNC: NEGATIVE NG/ML
NORFENTANYL UR-MCNC: ABNORMAL NG/ML
NORHYDROCODONE UR CFM-MCNC: NEGATIVE NG/ML
NOROXYCODONE UR CFM-MCNC: NEGATIVE NG/ML
NORTRAMADOL UR-MCNC: 5394 NG/ML
OH-ETHYLFLURAZ UR-MCNC: NEGATIVE NG/ML
OXAZEPAM UR-MCNC: NEGATIVE NG/ML
OXIDANTS UR QL: NEGATIVE MCG/ML
OXYCODONE UR CFM-MCNC: NEGATIVE NG/ML
OXYMORPHONE UR CFM-MCNC: NEGATIVE NG/ML
PCP UR QL: NEGATIVE NG/ML
PH UR: 6.6 [PH] (ref 4.5–9)
QUEST 6 ACETYLMORPHINE: ABNORMAL
QUEST NOTES AND COMMENTS: ABNORMAL
QUEST PATIENT HISTORICAL REPORT: ABNORMAL
QUEST ZOLPIDEM: ABNORMAL
TEMAZEPAM UR-MCNC: NEGATIVE NG/ML
THC UR QL: NEGATIVE NG/ML
TRAMADOL UR-MCNC: ABNORMAL NG/ML
ZOLPIDEM PHENYL-4-CARB UR CFM-MCNC: ABNORMAL NG/ML

## 2025-08-05 LAB
1OH-MIDAZOLAM UR-MCNC: NEGATIVE NG/ML
7AMINOCLONAZEPAM UR-MCNC: NEGATIVE NG/ML
A-OH ALPRAZ UR-MCNC: NEGATIVE NG/ML
A-OH-TRIAZOLAM UR-MCNC: NEGATIVE NG/ML
AMPHETAMINES UR QL: NEGATIVE NG/ML
BARBITURATES UR QL: NEGATIVE NG/ML
BZE UR QL: NEGATIVE NG/ML
CODEINE UR-MCNC: NEGATIVE NG/ML
CREAT UR-MCNC: 39.9 MG/DL
DRUG SCREEN COMMENT UR-IMP: ABNORMAL
EDDP UR-MCNC: NEGATIVE NG/ML
FENTANYL UR-MCNC: NEGATIVE NG/ML
HYDROCODONE UR-MCNC: NEGATIVE NG/ML
HYDROMORPHONE UR-MCNC: NEGATIVE NG/ML
LORAZEPAM UR-MCNC: NEGATIVE NG/ML
METHADONE UR-MCNC: NEGATIVE NG/ML
MORPHINE UR-MCNC: NEGATIVE NG/ML
NORDIAZEPAM UR-MCNC: NEGATIVE NG/ML
NORFENTANYL UR-MCNC: NEGATIVE NG/ML
NORHYDROCODONE UR CFM-MCNC: NEGATIVE NG/ML
NOROXYCODONE UR CFM-MCNC: NEGATIVE NG/ML
NORTRAMADOL UR-MCNC: 5394 NG/ML
OH-ETHYLFLURAZ UR-MCNC: NEGATIVE NG/ML
OXAZEPAM UR-MCNC: NEGATIVE NG/ML
OXIDANTS UR QL: NEGATIVE MCG/ML
OXYCODONE UR CFM-MCNC: NEGATIVE NG/ML
OXYMORPHONE UR CFM-MCNC: NEGATIVE NG/ML
PCP UR QL: NEGATIVE NG/ML
PH UR: 6.6 [PH] (ref 4.5–9)
QUEST 6 ACETYLMORPHINE: NEGATIVE NG/ML
QUEST NOTES AND COMMENTS: ABNORMAL
QUEST ZOLPIDEM: NEGATIVE NG/ML
TEMAZEPAM UR-MCNC: NEGATIVE NG/ML
THC UR QL: NEGATIVE NG/ML
TRAMADOL UR-MCNC: ABNORMAL NG/ML
ZOLPIDEM PHENYL-4-CARB UR CFM-MCNC: NEGATIVE NG/ML

## 2025-08-13 ENCOUNTER — HOSPITAL ENCOUNTER (OUTPATIENT)
Dept: RADIOLOGY | Facility: HOSPITAL | Age: 70
Discharge: HOME | End: 2025-08-13
Payer: COMMERCIAL

## 2025-08-13 DIAGNOSIS — M54.16 LUMBAR RADICULOPATHY: ICD-10-CM

## 2025-08-13 PROCEDURE — 72114 X-RAY EXAM L-S SPINE BENDING: CPT

## 2025-08-13 PROCEDURE — 72072 X-RAY EXAM THORAC SPINE 3VWS: CPT

## 2025-08-13 PROCEDURE — 72114 X-RAY EXAM L-S SPINE BENDING: CPT | Performed by: RADIOLOGY

## 2025-09-04 ENCOUNTER — OFFICE VISIT (OUTPATIENT)
Dept: PRIMARY CARE | Facility: CLINIC | Age: 70
End: 2025-09-04
Payer: COMMERCIAL

## 2025-09-04 VITALS
DIASTOLIC BLOOD PRESSURE: 62 MMHG | HEART RATE: 72 BPM | BODY MASS INDEX: 23.56 KG/M2 | OXYGEN SATURATION: 99 % | WEIGHT: 124.8 LBS | TEMPERATURE: 97.7 F | SYSTOLIC BLOOD PRESSURE: 108 MMHG | HEIGHT: 61 IN

## 2025-09-04 DIAGNOSIS — M79.671 PAIN OF RIGHT FOOT: ICD-10-CM

## 2025-09-04 DIAGNOSIS — M79.89 SWELLING OF RIGHT FOOT: Primary | ICD-10-CM

## 2025-09-04 PROCEDURE — 3008F BODY MASS INDEX DOCD: CPT | Performed by: FAMILY MEDICINE

## 2025-09-04 PROCEDURE — 3074F SYST BP LT 130 MM HG: CPT | Performed by: FAMILY MEDICINE

## 2025-09-04 PROCEDURE — 99213 OFFICE O/P EST LOW 20 MIN: CPT | Performed by: FAMILY MEDICINE

## 2025-09-04 PROCEDURE — 1125F AMNT PAIN NOTED PAIN PRSNT: CPT | Performed by: FAMILY MEDICINE

## 2025-09-04 PROCEDURE — 1036F TOBACCO NON-USER: CPT | Performed by: FAMILY MEDICINE

## 2025-09-04 PROCEDURE — 1159F MED LIST DOCD IN RCRD: CPT | Performed by: FAMILY MEDICINE

## 2025-09-04 PROCEDURE — 3078F DIAST BP <80 MM HG: CPT | Performed by: FAMILY MEDICINE

## 2025-09-04 RX ORDER — KETOROLAC TROMETHAMINE 30 MG/ML
60 INJECTION, SOLUTION INTRAMUSCULAR; INTRAVENOUS ONCE
Status: COMPLETED | OUTPATIENT
Start: 2025-09-04 | End: 2025-09-04

## 2025-09-04 RX ORDER — METHYLPREDNISOLONE ACETATE 80 MG/ML
80 INJECTION, SUSPENSION INTRA-ARTICULAR; INTRALESIONAL; INTRAMUSCULAR; SOFT TISSUE ONCE
Status: COMPLETED | OUTPATIENT
Start: 2025-09-04 | End: 2025-09-04

## 2025-09-04 RX ADMIN — KETOROLAC TROMETHAMINE 60 MG: 30 INJECTION, SOLUTION INTRAMUSCULAR; INTRAVENOUS at 16:47

## 2025-09-04 RX ADMIN — METHYLPREDNISOLONE ACETATE 80 MG: 80 INJECTION, SUSPENSION INTRA-ARTICULAR; INTRALESIONAL; INTRAMUSCULAR; SOFT TISSUE at 16:48

## 2025-09-04 ASSESSMENT — PAIN SCALES - GENERAL: PAINLEVEL_OUTOF10: 9

## 2025-09-05 ENCOUNTER — HOSPITAL ENCOUNTER (OUTPATIENT)
Dept: RADIOLOGY | Facility: HOSPITAL | Age: 70
Discharge: HOME | End: 2025-09-05
Payer: COMMERCIAL

## 2025-09-05 DIAGNOSIS — M79.89 SWELLING OF RIGHT FOOT: ICD-10-CM

## 2025-09-05 DIAGNOSIS — M79.671 PAIN OF RIGHT FOOT: ICD-10-CM

## 2025-09-05 PROCEDURE — 73630 X-RAY EXAM OF FOOT: CPT | Mod: RT

## 2025-09-05 PROCEDURE — 73630 X-RAY EXAM OF FOOT: CPT | Mod: RIGHT SIDE | Performed by: STUDENT IN AN ORGANIZED HEALTH CARE EDUCATION/TRAINING PROGRAM

## 2025-09-06 LAB
ALBUMIN SERPL-MCNC: 4.1 G/DL (ref 3.6–5.1)
ALP SERPL-CCNC: 69 U/L (ref 37–153)
ALT SERPL-CCNC: 10 U/L (ref 6–29)
ANION GAP SERPL CALCULATED.4IONS-SCNC: 9 MMOL/L (CALC) (ref 7–17)
AST SERPL-CCNC: 20 U/L (ref 10–35)
BILIRUB SERPL-MCNC: 0.5 MG/DL (ref 0.2–1.2)
BUN SERPL-MCNC: 10 MG/DL (ref 7–25)
CALCIUM SERPL-MCNC: 9.1 MG/DL (ref 8.6–10.4)
CHLORIDE SERPL-SCNC: 105 MMOL/L (ref 98–110)
CO2 SERPL-SCNC: 24 MMOL/L (ref 20–32)
CREAT SERPL-MCNC: 0.71 MG/DL (ref 0.5–1.05)
EGFRCR SERPLBLD CKD-EPI 2021: 93 ML/MIN/1.73M2
ERYTHROCYTE [DISTWIDTH] IN BLOOD BY AUTOMATED COUNT: 13 % (ref 11–15)
ERYTHROCYTE [SEDIMENTATION RATE] IN BLOOD BY WESTERGREN METHOD: 14 MM/H
GLUCOSE SERPL-MCNC: 76 MG/DL (ref 65–139)
HCT VFR BLD AUTO: 38.7 % (ref 35–45)
HGB BLD-MCNC: 12.8 G/DL (ref 11.7–15.5)
MCH RBC QN AUTO: 30.3 PG (ref 27–33)
MCHC RBC AUTO-ENTMCNC: 33.1 G/DL (ref 32–36)
MCV RBC AUTO: 91.7 FL (ref 80–100)
PLATELET # BLD AUTO: 270 THOUSAND/UL (ref 140–400)
PMV BLD REES-ECKER: 11.7 FL (ref 7.5–12.5)
POTASSIUM SERPL-SCNC: 4.9 MMOL/L (ref 3.5–5.3)
PROT SERPL-MCNC: 6.6 G/DL (ref 6.1–8.1)
RBC # BLD AUTO: 4.22 MILLION/UL (ref 3.8–5.1)
SODIUM SERPL-SCNC: 138 MMOL/L (ref 135–146)
URATE SERPL-MCNC: 3.3 MG/DL (ref 2.5–7)
WBC # BLD AUTO: 6.9 THOUSAND/UL (ref 3.8–10.8)